# Patient Record
Sex: FEMALE | Race: WHITE | NOT HISPANIC OR LATINO | Employment: OTHER | URBAN - METROPOLITAN AREA
[De-identification: names, ages, dates, MRNs, and addresses within clinical notes are randomized per-mention and may not be internally consistent; named-entity substitution may affect disease eponyms.]

---

## 2017-11-16 ENCOUNTER — TELEPHONE (OUTPATIENT)
Dept: UROLOGY | Facility: CLINIC | Age: 77
End: 2017-11-16

## 2017-11-16 NOTE — TELEPHONE ENCOUNTER
Call placed to inquire what doctors office was referring the patient to obtain those records, no answer, message left for patient to call in the am.

## 2017-11-17 ENCOUNTER — OFFICE VISIT (OUTPATIENT)
Dept: UROLOGY | Facility: CLINIC | Age: 77
End: 2017-11-17
Payer: MEDICARE

## 2017-11-17 ENCOUNTER — HOSPITAL ENCOUNTER (OUTPATIENT)
Dept: RADIOLOGY | Facility: HOSPITAL | Age: 77
Discharge: HOME | End: 2017-11-17
Attending: NURSE PRACTITIONER
Payer: MEDICARE

## 2017-11-17 VITALS
HEART RATE: 108 BPM | WEIGHT: 170.19 LBS | HEIGHT: 68 IN | DIASTOLIC BLOOD PRESSURE: 85 MMHG | TEMPERATURE: 97 F | SYSTOLIC BLOOD PRESSURE: 133 MMHG | BODY MASS INDEX: 25.79 KG/M2

## 2017-11-17 DIAGNOSIS — R31.0 GROSS HEMATURIA: Primary | ICD-10-CM

## 2017-11-17 DIAGNOSIS — N28.89 RENAL MASS: Primary | ICD-10-CM

## 2017-11-17 DIAGNOSIS — R31.0 GROSS HEMATURIA: ICD-10-CM

## 2017-11-17 DIAGNOSIS — R53.83 FATIGUE, UNSPECIFIED TYPE: ICD-10-CM

## 2017-11-17 DIAGNOSIS — R30.0 DYSURIA: ICD-10-CM

## 2017-11-17 DIAGNOSIS — N39.0 RECURRENT URINARY TRACT INFECTION: ICD-10-CM

## 2017-11-17 PROCEDURE — 99204 OFFICE O/P NEW MOD 45 MIN: CPT | Mod: S$PBB,,, | Performed by: NURSE PRACTITIONER

## 2017-11-17 PROCEDURE — 25500020 PHARM REV CODE 255

## 2017-11-17 PROCEDURE — 88112 CYTOPATH CELL ENHANCE TECH: CPT | Performed by: PATHOLOGY

## 2017-11-17 PROCEDURE — 99999 PR PBB SHADOW E&M-EST. PATIENT-LVL V: CPT | Mod: PBBFAC,,, | Performed by: NURSE PRACTITIONER

## 2017-11-17 PROCEDURE — 99215 OFFICE O/P EST HI 40 MIN: CPT | Mod: PBBFAC,PN | Performed by: NURSE PRACTITIONER

## 2017-11-17 PROCEDURE — 87086 URINE CULTURE/COLONY COUNT: CPT

## 2017-11-17 PROCEDURE — 88112 CYTOPATH CELL ENHANCE TECH: CPT | Mod: 26,,, | Performed by: PATHOLOGY

## 2017-11-17 PROCEDURE — 74178 CT ABD&PLV WO CNTR FLWD CNTR: CPT | Mod: TC

## 2017-11-17 PROCEDURE — 74178 CT ABD&PLV WO CNTR FLWD CNTR: CPT | Mod: 26,,, | Performed by: RADIOLOGY

## 2017-11-17 RX ORDER — SIMVASTATIN 40 MG/1
TABLET, FILM COATED ORAL
COMMUNITY
End: 2019-01-02

## 2017-11-17 RX ORDER — DICLOFENAC SODIUM 10 MG/G
GEL TOPICAL
COMMUNITY
End: 2018-07-05

## 2017-11-17 RX ORDER — OXYBUTYNIN CHLORIDE 5 MG/1
TABLET ORAL
COMMUNITY
End: 2017-12-04

## 2017-11-17 RX ORDER — PANTOPRAZOLE SODIUM 40 MG/1
TABLET, DELAYED RELEASE ORAL
COMMUNITY
End: 2018-07-05

## 2017-11-17 RX ORDER — PHENAZOPYRIDINE HYDROCHLORIDE 200 MG/1
TABLET, FILM COATED ORAL
COMMUNITY
End: 2018-07-05

## 2017-11-17 RX ORDER — CHOLECALCIFEROL (VITAMIN D3) 25 MCG
1000 TABLET ORAL
COMMUNITY
End: 2019-01-02

## 2017-11-17 RX ORDER — NITROFURANTOIN 25; 75 MG/1; MG/1
CAPSULE ORAL
Status: ON HOLD | COMMUNITY
End: 2017-12-12 | Stop reason: HOSPADM

## 2017-11-17 RX ORDER — NYSTATIN 100000 [USP'U]/ML
5 SUSPENSION ORAL
COMMUNITY
End: 2018-07-05

## 2017-11-17 RX ORDER — IBUPROFEN 100 MG/5ML
1000 SUSPENSION, ORAL (FINAL DOSE FORM) ORAL
COMMUNITY
End: 2019-01-02

## 2017-11-17 RX ORDER — ACETAMINOPHEN 500 MG
500 TABLET ORAL
COMMUNITY
End: 2018-07-05

## 2017-11-17 RX ORDER — NORTRIPTYLINE HYDROCHLORIDE 50 MG/1
CAPSULE ORAL
COMMUNITY

## 2017-11-17 RX ADMIN — IOHEXOL 75 ML: 350 INJECTION, SOLUTION INTRAVENOUS at 12:11

## 2017-11-17 NOTE — PROGRESS NOTES
[facility] Note  Staff: CLYDE Ludwig    Referring provider and please cc: Brianda Simmons NP  PCP: Brianda Simmons NP ([facility])    Chief Complaint: Recurrent urinary tract infections, gross hematuria    Subjective:        HPI: Denisa Virgen is a 77 y.o. female new patient to Urology clinic, referred by Brianda Simmons NP with [facility] presents with c/o recurrent urinary tract infections and gross hematuria symptoms.  Pt was seen by Mrs. Simmons yesterday in clinic for evaluation of symptoms.  Pt has a history of recurrent bladder infections, but today she states she started seeing a discoloration in her urine.  Yesterday, her PCP office prescribed the patient Macrobid 100 mg one tablet twice daily x 14 days for questionable infection at this time.  I am unsure as to whether her PCP office sent for urine culture tests.  (UA results below)    Questions asked pt during office visit today:  DTF: YES, NTF: 3x night  Urgency:Yes, incontinence with urgency? Yes;   Incontinence with laughing or straining: Yes; bothersome: Yes   If yes, how many pads/day? 1-2 daily  *Reviewing med list it states she currently takes oxybutynin 5 mg one tablet daily, but in office today, pt denies ever taking this medication.  Gross Hematuria:  YES  History of UTI: Yes - kidney infections x 3-4 years.  Sexually active?: No  Constipation issues?:  YES, does not take daily meds for this.    REVIEW OF SYSTEMS:  Review of Systems   Constitutional: Positive for malaise/fatigue. Negative for chills, diaphoresis, fever and weight loss.   HENT: Negative for congestion, hearing loss, nosebleeds and sore throat.    Eyes: Negative for blurred vision and pain.   Respiratory: Negative for cough and wheezing.    Cardiovascular: Negative for chest pain, palpitations and leg swelling.   Gastrointestinal: Negative for abdominal pain, heartburn, nausea and vomiting.   Genitourinary: Positive for dysuria, frequency,  hematuria and urgency. Negative for flank pain.        +Incontinence   Musculoskeletal: Negative for back pain, joint pain, myalgias and neck pain.   Skin: Negative for itching and rash.   Neurological: Negative for dizziness, tremors, sensory change, seizures, loss of consciousness, weakness and headaches.   Endo/Heme/Allergies: Does not bruise/bleed easily.   Psychiatric/Behavioral: Negative for depression and suicidal ideas. The patient is not nervous/anxious.      PMHx:  Past Medical History:   Diagnosis Date    Asthma     Chronic low back pain     COPD (chronic obstructive pulmonary disease)     GERD (gastroesophageal reflux disease)     High cholesterol      Kidney stones: No    PSHx:  Past Surgical History:   Procedure Laterality Date    HYSTERECTOMY      TONSILLECTOMY      veins in left leg       Fam Hx:   malignancies: No, gyn malignancies: No   kidney stones: No     Social History     Social History    Marital status: Single     Spouse name: N/A    Number of children: N/A    Years of education: N/A     Social History Main Topics    Smoking status: Former Smoker    Smokeless tobacco: Never Used    Alcohol use No    Drug use: No    Sexual activity: No     Other Topics Concern    None     Social History Narrative    None     Allergies:  Levofloxacin    Medications: reviewed   Anticoagulation: No    Objective:     Vitals:    11/17/17 1050   BP: 133/85   Pulse: 108   Temp: 97.4 °F (36.3 °C)     Physical Exam   Vitals reviewed.  Constitutional: She is oriented to person, place, and time. She appears well-developed and well-nourished.   HENT:   Head: Normocephalic and atraumatic.   Eyes: Conjunctivae and EOM are normal. Pupils are equal, round, and reactive to light.   Neck: Normal range of motion. Neck supple.   Cardiovascular: Normal rate, regular rhythm, normal heart sounds and intact distal pulses.    Pulmonary/Chest: Effort normal and breath sounds normal.   Abdominal: Soft. Bowel sounds  "are normal.   Musculoskeletal: Normal range of motion.   Neurological: She is alert and oriented to person, place, and time. She has normal reflexes.   Skin: Skin is warm and dry.     Psychiatric: She has a normal mood and affect. Her behavior is normal. Judgment and thought content normal.     LABS REVIEW:  BUN/Cr done on 10/25/17:  10/0.69  GFR:  84    UA today: Pt's urine today was unable to dip, mixed with pyridium and blood.    Hx of Urinalysis done on 11/16/17:  (No records of culture being sent out)  Dark, Yellow and Cloudy Urine  SG  1.025; ph  5.5  Moderate Blood  30 Protein  Moderate Leukocytes    UCx:  08/15/17:  +E.Coli infection  (only culture results seen on file)    Assessment:       1. Gross hematuria    2. Recurrent urinary tract infection    3. Fatigue, unspecified type    4. Dysuria          Plan:   Gross hematuria with questionable "recurrent" urinary tract infections or gross hematuria of unknown origin.    1.  We will send urine for culture and cytology testing from today.  2.  CT Urogram with serum cr to be performed for further evaluation.  3.  Encouraged pt to stay on Macrobid until we receive results from lab/imaging.    F/u with Urologist for possible cystoscopy due to gross hematuria.    [facility]: Pending    Mitzi Hunt, MIKHAIL-PINA  "

## 2017-11-17 NOTE — PATIENT INSTRUCTIONS
What is Hematuria?  Blood in your urine is a condition known as hematuria. Most of the time, the cause of hematuria is not serious. But, never ignore blood in the urine. Your doctor can evaluate you to find the cause of the bleeding and treat it, if needed.  Types of hematuria  · Gross hematuria means that the blood can be seen by the naked eye. The urine may look pinkish, brownish, or bright red.  · Microscopic hematuria means that the urine is clear, but blood cells can be seen when urine is looked at under a microscope or tested in a lab.  Both types of hematuria can have the same causes. Neither one is necessarily more serious than the other. With either type, you may have other symptoms, such as pain, pressure, or burning when you urinate, abdominal pain, or back pain. Or, you may not have any other symptoms. No matter how much blood is found, the cause of the bleeding needs to be identified.  Finding the cause of hematuria  To evaluate your condition, your doctor will first confirm that blood is indeed present. Then other tests are done to pinpoint where the blood is coming from and why. Your doctor will decide which tests will best determine the cause of your hematuria. Some common tests are listed below.  · History and physical exam  · Lab tests may include urinalysis, a urine culture, a urine cytology, and various blood tests  · Cystoscopy  · Computed tomography (CT) or CT urography  · Magnetic resonance imaging (MRI) or MR urography  · Ultrasound of the kidney  · Kidney biopsy  Causes of hematuria include the very benign (exercised induced hematuria) to the very severe (cancer of the urinary system). A variety of treatments are available depending on the cause.  Date Last Reviewed: 12/2/2016 © 2000-2017 [employer]. [address] [place] [address]. All rights reserved. This information is not intended as a substitute for professional medical care. Always follow your healthcare  professional's instructions.        Hematuria: Possible Causes     Many things can lead to blood in the urine (hematuria). The blood may be seen with the eye (macroscopic or gross hematuria). Or it may only be seen when the urine is looked at under a microscope (microscopic hematuria). Some of the most common causes of blood in the urine are listed below. Often, no cause for the blood can be found. This is called idiopathic hematuria.  · Kidney or bladder stones are collections of crystals. They form in the urine. Stones may be found anywhere in the urinary tract. But they form most often in the kidneys or bladder. In addition to blood in the urine, they can cause severe pain.  · BPH stands for benign prostatic hyperplasia. It is enlargement of the prostate gland. It happens as men age. BPH often causes problems with urination. It sometimes causes blood in the urine.  · A urinary tract infection (UTI) is due to bacteria growing in the urinary tract. It can cause blood in the urine. Other symptoms include burning or pain with urination. You may need to urinate often or urgently. You may also have a fever.  · Damage to the urinary tract may cause blood in the urine. This damage may be due to a blow or accident. It may also result from the use of a urinary catheter. Very hard exercise may sometimes irritate the urinary tract and cause bleeding.  · Cancer may occur anywhere in the urinary tract. A tumor may sometimes cause no symptoms other than bleeding.     Other possible causes of bleeding include:  · Prostatitis (infection of the prostate gland)  · Taking anticoagulants  · Blockage in the urinary tract  · Disease or inflammation of the kidney  · Cystic diseases of the kidneys  · Sickle cell anemia  · Vigorous exercise  · Endometriosis  Date Last Reviewed: 12/1/2016 © 2000-2017 [employer]. [address] [place] [address], [place] [address]. All rights reserved. This information is not intended as a substitute  "for professional medical care. Always follow your healthcare professional's instructions.        Bladder Infection, Female (Adult)    Urine is normally doesn't have any bacteria in it. But bacteria can get into the urinary tract from the skin around the rectum. Or they can travel in the blood from elsewhere in the body. Once they are in your urinary tract, they can cause infection in the urethra (urethritis), the bladder (cystitis), or the kidneys (pyelonephritis).  The most common place for an infection is in the bladder. This is called a bladder infection. This is one of the most common infections in women. Most bladder infections are easily treated. They are not serious unless the infection spreads to the kidney.  The phrases "bladder infection," "UTI," and "cystitis" are often used to describe the same thing. But they are not always the same. Cystitis is an inflammation of the bladder. The most common cause of cystitis is an infection.  Symptoms  The infection causes inflammation in the urethra and bladder. This causes many of the symptoms. The most common symptoms of a bladder infection are:  · Pain or burning when urinating  · Having to urinate more often than usual  · Urgent need to urinate  · Only a small amount of urine comes out  · Blood in urine  · Abdominal discomfort. This is usually in the lower abdomen above the pubic bone.  · Cloudy urine  · Strong- or bad-smelling urine  · Unable to urinate (urinary retention)  · Unable to hold urine in (urinary incontinence)  · Fever  · Loss of appetite  · Confusion (in older adults)  Causes  Bladder infections are not contagious. You can't get one from someone else, from a toilet seat, or from sharing a bath.  The most common cause of bladder infections is bacteria from the bowels. The bacteria get onto the skin around the opening of the urethra. From there, they can get into the urine and travel up to the bladder, causing inflammation and infection. This usually " happens because of:  · Wiping improperly after urinating. Always wipe from front to back.  · Bowel incontinence  · Pregnancy  · Procedures such as having a catheter inserted  · Older age  · Not emptying your bladder. This can allow bacteria a chance to grow in your urine.  · Dehydration  · Constipation  · Sex  · Use of a diaphragm for birth control   Treatment  Bladder infections are diagnosed by a urine test. They are treated with antibiotics and usually clear up quickly without complications. Treatment helps prevent a more serious kidney infection.  Medicines  Medicines can help in the treatment of a bladder infection:  · Take antibiotics until they are used up, even if you feel better. It is important to finish them to make sure the infection has cleared.  · You can use acetaminophen or ibuprofen for pain, fever, or discomfort, unless another medicine was prescribed. If you have chronic liver or kidney disease, talk with your healthcare provider before using these medicines. Also talk with your provider if you've ever had a stomach ulcer or gastrointestinal bleeding, or are taking blood-thinner medicines.  · If you are given phenazopydridine to reduce burning with urination, it will cause your urine to become a bright orange color. This can stain clothing.  Care and prevention  These self-care steps can help prevent future infections:  · Drink plenty of fluids to prevent dehydration and flush out your bladder. Do this unless you must restrict fluids for other health reasons, or your doctor told you not to.  · Proper cleaning after going to the bathroom is important. Wipe from front to back after using the toilet to prevent the spread of bacteria.  · Urinate more often. Don't try to hold urine in for a long time.  · Wear loose-fitting clothes and cotton underwear. Avoid tight-fitting pants.  · Improve your diet and prevent constipation. Eat more fresh fruit and vegetables, and fiber, and less junk and fatty  foods.  · Avoid sex until your symptoms are gone.  · Avoid caffeine, alcohol, and spicy foods. These can irritate your bladder.  · Urinate right after intercourse to flush out your bladder.  · If you use birth control pills and have frequent bladder infections, discuss it with your doctor.  Follow-up care  Call your healthcare provider if all symptoms are not gone after 3 days of treatment. This is especially important if you have repeat infections.  If a culture was done, you will be told if your treatment needs to be changed. If directed, you can call to find out the results.  If X-rays were done, you will be told if the results will affect your treatment.  Call 911  Call 911 if any of the following occur:  · Trouble breathing  · Hard to wake up or confusion  · Fainting or loss of consciousness  · Rapid heart rate  When to seek medical advice  Call your healthcare provider right away if any of these occur:  · Fever of 100.4ºF (38.0ºC) or higher, or as directed by your healthcare provider  · Symptoms are not better by the third day of treatment  · Back or belly (abdominal) pain that gets worse  · Repeated vomiting, or unable to keep medicine down  · Weakness or dizziness  · Vaginal discharge  · Pain, redness, or swelling in the outer vaginal area (labia)  Date Last Reviewed: 10/1/2016  © 3432-5663 [employer]. [address] [place] [address], [place] [address]. All rights reserved. This information is not intended as a substitute for professional medical care. Always follow your healthcare professional's instructions.

## 2017-11-18 LAB — BACTERIA UR CULT: NO GROWTH

## 2017-11-21 ENCOUNTER — HOSPITAL ENCOUNTER (OUTPATIENT)
Dept: RADIOLOGY | Facility: HOSPITAL | Age: 77
Discharge: HOME | End: 2017-11-21
Attending: NURSE PRACTITIONER
Payer: MEDICARE

## 2017-11-21 DIAGNOSIS — N28.89 RENAL MASS: ICD-10-CM

## 2017-11-21 PROCEDURE — 74183 MRI ABD W/O CNTR FLWD CNTR: CPT | Mod: TC

## 2017-11-21 PROCEDURE — A9585 GADOBUTROL INJECTION: HCPCS | Performed by: NURSE PRACTITIONER

## 2017-11-21 PROCEDURE — 74183 MRI ABD W/O CNTR FLWD CNTR: CPT | Mod: 26,,, | Performed by: RADIOLOGY

## 2017-11-21 PROCEDURE — 25500020 PHARM REV CODE 255: Performed by: NURSE PRACTITIONER

## 2017-11-21 RX ORDER — GADOBUTROL 604.72 MG/ML
7 INJECTION INTRAVENOUS
Status: COMPLETED | OUTPATIENT
Start: 2017-11-21 | End: 2017-11-21

## 2017-11-21 RX ORDER — GADOBUTROL 604.72 MG/ML
INJECTION INTRAVENOUS
Status: DISPENSED
Start: 2017-11-21 | End: 2017-11-21

## 2017-11-21 RX ADMIN — GADOBUTROL 7 ML: 604.72 INJECTION INTRAVENOUS at 08:11

## 2017-11-29 ENCOUNTER — TELEPHONE (OUTPATIENT)
Dept: UROLOGY | Facility: CLINIC | Age: 77
End: 2017-11-29

## 2017-11-29 NOTE — TELEPHONE ENCOUNTER
Please call and inform pt of MRI results:  1.  Multiple simple cysts in liver involving the left and right hepatic lobes.  2. 3 renal masses are noted, each of which demonstrates characteristics consistent with 3 normal cysts which no follow-up is required on the cysts, but Urologist will further explain MRI results to you at next visit.      ----- Message from Tricia Jaime sent at 11/29/2017 10:05 AM CST -----  Contact: self  Patient is needing the results of her MRI   Please call back [phone]  Or cell [phone]

## 2017-12-04 ENCOUNTER — OFFICE VISIT (OUTPATIENT)
Dept: UROLOGY | Facility: CLINIC | Age: 77
End: 2017-12-04
Payer: MEDICARE

## 2017-12-04 VITALS
DIASTOLIC BLOOD PRESSURE: 83 MMHG | SYSTOLIC BLOOD PRESSURE: 133 MMHG | HEART RATE: 111 BPM | RESPIRATION RATE: 18 BRPM | BODY MASS INDEX: 25.79 KG/M2 | HEIGHT: 68 IN | WEIGHT: 170.19 LBS

## 2017-12-04 DIAGNOSIS — R31.0 GROSS HEMATURIA: Primary | ICD-10-CM

## 2017-12-04 DIAGNOSIS — R39.15 URINARY URGENCY: ICD-10-CM

## 2017-12-04 DIAGNOSIS — N28.1 RENAL CYSTS, ACQUIRED, BILATERAL: ICD-10-CM

## 2017-12-04 PROCEDURE — 99215 OFFICE O/P EST HI 40 MIN: CPT | Mod: PBBFAC,PN | Performed by: UROLOGY

## 2017-12-04 PROCEDURE — 81002 URINALYSIS NONAUTO W/O SCOPE: CPT | Mod: PBBFAC,PN | Performed by: UROLOGY

## 2017-12-04 PROCEDURE — 87086 URINE CULTURE/COLONY COUNT: CPT

## 2017-12-04 PROCEDURE — 99999 PR PBB SHADOW E&M-EST. PATIENT-LVL V: CPT | Mod: PBBFAC,,, | Performed by: UROLOGY

## 2017-12-04 PROCEDURE — 99214 OFFICE O/P EST MOD 30 MIN: CPT | Mod: S$PBB,,, | Performed by: UROLOGY

## 2017-12-04 RX ORDER — MONTELUKAST SODIUM 10 MG/1
TABLET ORAL
COMMUNITY

## 2017-12-04 RX ORDER — TIZANIDINE HYDROCHLORIDE 4 MG/1
CAPSULE, GELATIN COATED ORAL
COMMUNITY
End: 2019-03-07 | Stop reason: SDUPTHER

## 2017-12-04 RX ORDER — PREDNISONE 50 MG/1
TABLET ORAL
COMMUNITY
End: 2018-07-05

## 2017-12-04 RX ORDER — VITAMIN E 268 MG
CAPSULE ORAL
COMMUNITY
End: 2019-01-02

## 2017-12-04 RX ORDER — VITAMIN A 3000 MCG
10000 CAPSULE ORAL
COMMUNITY
End: 2019-01-02

## 2017-12-04 RX ORDER — FLUTICASONE FUROATE AND VILANTEROL 100; 25 UG/1; UG/1
POWDER RESPIRATORY (INHALATION)
COMMUNITY
End: 2019-05-01

## 2017-12-04 RX ORDER — ALPRAZOLAM 1 MG/1
TABLET ORAL
COMMUNITY
End: 2019-01-02

## 2017-12-04 NOTE — PATIENT INSTRUCTIONS
Discussed conservative measures to control urgency and frequency including avoiding bladder irritants, timed voiding (go to urinate every 2 hours despite need), not postponing voiding (dont hold it!), and bowel regimen as distended bowel has extrinsic compressive effect on bladder.   - miralax (1cap/day, can go down to 1/2 cap or up to 2x/d)  - stool softener - 1x/day to 2x/day - I.e. colace  - fiber supplements (metamucil, fiber pills) and increase dietary fiber  - prunes (sunsweet makes flavored ones - lemon cherry etc - [employer] etc)  Can use any/all in any combination daily that produces soft daily bowel movement - will take some adjustment     Discussed bladder irritants include coffe (even decaf), tea, alcohol, soda, spicy foods, acidic juices (orange, tomato), vinegar, and artificial sweeteners.  - avoid especially in afternoon and evening    INCREASE water intake     Stop oxybutynin/ditropan. Start myrbetriq (mirabegron)    12/12/17 - cystoscopy - look in bladder

## 2017-12-04 NOTE — Clinical Note
1. Forgot to order/have you schedule RBUS. She comes from [place] so will either need to schedule for tues right before (between 1130-12) or right after (140-230) at ultrasound. Not too much earlier or later given her commute - please arrange with ultrasound dept first if spot not available before calling patient  2. Need all urine cxs from alphonso ferraro np and pcp listed on file, as well as requested from [facility]

## 2017-12-05 LAB — BACTERIA UR CULT: NORMAL

## 2017-12-08 NOTE — PROGRESS NOTES
[facility] Urology:    Denisa Virgen is a 77 y.o. female who presents for evaluation of renal cysts and gross hematuria.    She was initially seen by MELINDA Hunt on 11/17/17 at the referral of Brianda Simmons NP. She has reportedly had recurrent UTIs (last E Coli on 8/5/17) and had recent gross hematuria. She had been given macrobid 14d course at that time after seeing red discoloration in urine. Udip in clinic had moderate blood. Ucx negative.  She did also c/o urgency and urge 1-2 daily, and had been taking oxybutynin 5mg daily by report but was unsure of if taking it. Also noted constipation.  Cytology negative. CT urogram was ordered and demonstrated:    There is a an approximately 1 cm mass arising posteriorly from the mid left kidney which measures 62 Hounsfield units on the noncontrast phase, 71 Hounsfield units on the contrast phase and 64 Hounsfield units on the delayed phase. This is most consistent with a small hemorrhagic cyst. There is also an approximately 1.3 cm mass along the superior margin of the right kidney measuring 60 Hounsfield units on the precontrast phase, 56 Hounsfield units on the nephrographic phase and 49 Hounsfield units on the delayed phase, most consistent with a hemorrhagic cyst. A third mass measuring 9 mm on nephrographic phase image 43 is not well seen on the other phases and is too small to characterize. No nephroureterolithiasis is identified. The kidneys excrete contrast symmetrically. No ureteral filling defect is identified. No hydroureteronephrosis is seen. The urinary bladder is mildly well-distended. No focal bladder wall thickening or bladder filling defect is identified. No abdominal or pelvic lymphadenopathy is seen. The aorta is non-aneurysmal. There is moderate atherosclerosis.  - on personal review, posterior left mass is most obvious, 1.3cm, hyperdense, 49-51HU pre contrast, 60-79 HU post contrast.     A follow up MRI was obtained, which confirmed finding of hemorrhagic  cysts  There is a T1 hyperintense and T2 hypointense, 1.6 cm mass in the upper pole of the right kidney consistent with a hemorrhagic cyst. There is a T1 hyperintense and T2 hypointense, 1.4 cm nonenhancing mass involving the mid left kidney, posteriorly, consistent with a hemorrhagic cyst. There is a slightly T1 hyperintense and T2 hypointense, nonenhancing mass in the upper pole of the left kidney measuring 1 cm, consistent with a hemorrhagic cyst. No additional renal masses are identified. No hydroureteronephrosis is seen    Her PCP told her there was blood in her urine day prior to eval above, and by that day had pink urine. Has not seen any further gross hematuria since completing macrobid.  No family history  malignancy  Quit smoking 40 years ago after 10 years of smoking  No chemical exposures    Urgency all the time for about 1 month, to the point she has bough pads and is using about 9-10 per day. UUI with most voids.  Reports recurrent UTIs which then become yeast infections.  Has had cultures with MELINDA Simmons and at [facility] in [place].  ++ constipation - may be up to 2 weeks between BMs x3-4 mos. Occasional laxative. Has increased water intake. No bowel regimen.  She denies dysuria  She drink 1-2 cups coffee/am, 1 cup about 4pm, occ tea.  Has bad dry mouth  On review of current med list, does seem to be taking oxybutynin  Udip 1+ leuks, trc prot, trc blood       Past Medical History:   Diagnosis Date    Asthma     Chronic low back pain     COPD (chronic obstructive pulmonary disease)     GERD (gastroesophageal reflux disease)     High cholesterol        Past Surgical History:   Procedure Laterality Date    HYSTERECTOMY      TONSILLECTOMY      veins in left leg         Family History   Problem Relation Age of Onset    Heart disease Father     Heart disease Mother        Social History     Social History    Marital status:      Spouse name: N/A    Number of children: N/A    Years  "of education: N/A     Occupational History    Not on file.     Social History Main Topics    Smoking status: Former Smoker     Quit date: 1970    Smokeless tobacco: Never Used    Alcohol use No    Drug use: No    Sexual activity: No     Other Topics Concern    Not on file     Social History Narrative    No narrative on file       Review of patient's allergies indicates:   Allergen Reactions    Levofloxacin Rash       Medications Reviewed: see MAR    ROS:    As noted above in HPI otherwise negative x 10 systems reviewed.     PHYSICAL EXAM:    Vitals:    12/04/17 0953   BP: 133/83   Pulse: (!) 111   Resp: 18     Body mass index is 25.88 kg/m². Weight: 77.2 kg (170 lb 3.1 oz) Height: 5' 8" (172.7 cm)       General: Alert, cooperative, no distress, appears stated age  Head: Normocephalic, without obvious abnormality, atraumatic  Neck: no masses, no thyromegaly, no lymphadenopathy  Eyes: PERRL, conjunctiva/corneas clear  Lungs: Respirations unlabored, normal effort, no accessory muscle use  CV: Warm and well perfused extremities  Abdomen: Soft, non-tender, no CVA tenderness, no hepatosplenomegaly, no hernia  Extremities: Extremities normal, atraumatic, no cyanosis or edema  Skin: Normal color, texture, and turgor, no rashes or lesions  Psych: Appropriate, well oriented, normal affect, normal mood  Neuro: Non-focal      Assessment/Diagnosis:    1. Gross hematuria  POCT URINE DIPSTICK WITHOUT MICROSCOPE    Urine culture    Case Request Operating Room: CYSTOSCOPY    Place in Outpatient   2. Urinary urgency  mirabegron 50 mg Tb24   3. Renal cysts, acquired, bilateral  US Kidney Only       Plans:  Discussed conservative measures to control urgency and frequency including avoiding bladder irritants, timed voiding (go to urinate every 2 hours despite need), not postponing voiding (dont hold it!), and bowel regimen as distended bowel has extrinsic compressive effect on bladder. Written instructions/OTC agent recs for " daily BM provided.   Discussed bladder irritants include coffe (even decaf), tea, alcohol, soda, spicy foods, acidic juices (orange, tomato), vinegar, and artificial sweeteners.  - avoid especially in afternoon and evening   INCREASE water intake   Stop oxybutynin/ditropan. Avoid anticholinergics due to risk of dementia and AMS in patients over 70, not to mention she has severe constipation and dry mouth which are known side effects.  As her urgency is severe and would benefit from medical therapy in addition to conservative measures above, will Start myrbetriq (mirabegron)     For her gross hematuria, she has had negative workup so far, but given both gross hematuria and questionably recurrent UTIs with new urgency, advised she needs cystoscopic evaluation and has been scheduled 12/12/17 at [facility]. Also advised baseline renal US that day so her renal cysts can be followed in the future with ultrasound.  As she does come from [place], once workup complete and stable from urgency and urge incontinence, will likely follow up with Dr Dinh.

## 2017-12-12 ENCOUNTER — HOSPITAL ENCOUNTER (OUTPATIENT)
Facility: AMBULARY SURGERY CENTER | Age: 77
Discharge: HOME | End: 2017-12-12
Attending: UROLOGY | Admitting: UROLOGY
Payer: MEDICARE

## 2017-12-12 ENCOUNTER — SURGERY (OUTPATIENT)
Age: 77
End: 2017-12-12

## 2017-12-12 VITALS
TEMPERATURE: 98 F | HEIGHT: 68 IN | HEART RATE: 105 BPM | RESPIRATION RATE: 18 BRPM | SYSTOLIC BLOOD PRESSURE: 149 MMHG | OXYGEN SATURATION: 97 % | WEIGHT: 170 LBS | DIASTOLIC BLOOD PRESSURE: 72 MMHG | BODY MASS INDEX: 25.76 KG/M2

## 2017-12-12 DIAGNOSIS — R31.0 GROSS HEMATURIA: ICD-10-CM

## 2017-12-12 PROCEDURE — 52204 CYSTOSCOPY W/BIOPSY(S): CPT | Performed by: UROLOGY

## 2017-12-12 PROCEDURE — 52214 CYSTOSCOPY AND TREATMENT: CPT | Performed by: UROLOGY

## 2017-12-12 PROCEDURE — G8907 PT DOC NO EVENTS ON DISCHARG: HCPCS | Performed by: UROLOGY

## 2017-12-12 PROCEDURE — G8918 PT W/O PREOP ORDER IV AB PRO: HCPCS | Performed by: UROLOGY

## 2017-12-12 PROCEDURE — 88305 TISSUE EXAM BY PATHOLOGIST: CPT | Performed by: PATHOLOGY

## 2017-12-12 PROCEDURE — 52204 CYSTOSCOPY W/BIOPSY(S): CPT | Mod: ,,, | Performed by: UROLOGY

## 2017-12-12 PROCEDURE — 52224 CYSTOSCOPY AND TREATMENT: CPT | Performed by: UROLOGY

## 2017-12-12 RX ORDER — WATER 1 ML/ML
IRRIGANT IRRIGATION
Status: DISCONTINUED | OUTPATIENT
Start: 2017-12-12 | End: 2017-12-12 | Stop reason: HOSPADM

## 2017-12-12 RX ORDER — LIDOCAINE HYDROCHLORIDE 20 MG/ML
JELLY TOPICAL
Status: DISCONTINUED | OUTPATIENT
Start: 2017-12-12 | End: 2017-12-12 | Stop reason: HOSPADM

## 2017-12-12 RX ORDER — CEFUROXIME AXETIL 250 MG/1
250 TABLET ORAL DAILY
Qty: 90 TABLET | Refills: 0 | Status: SHIPPED | OUTPATIENT
Start: 2017-12-12 | End: 2018-01-30

## 2017-12-12 RX ORDER — FLUCONAZOLE 150 MG/1
150 TABLET ORAL DAILY
Qty: 1 TABLET | Refills: 1 | Status: SHIPPED | OUTPATIENT
Start: 2017-12-12 | End: 2017-12-13

## 2017-12-12 RX ORDER — ACETAMINOPHEN 325 MG/1
TABLET ORAL
Status: DISCONTINUED
Start: 2017-12-12 | End: 2017-12-12 | Stop reason: HOSPADM

## 2017-12-12 RX ORDER — ACETAMINOPHEN 325 MG/1
650 TABLET ORAL EVERY 6 HOURS PRN
Status: DISCONTINUED | OUTPATIENT
Start: 2017-12-12 | End: 2017-12-12 | Stop reason: HOSPADM

## 2017-12-12 RX ORDER — LIDOCAINE HYDROCHLORIDE 20 MG/ML
JELLY TOPICAL
Status: DISPENSED
Start: 2017-12-12 | End: 2017-12-13

## 2017-12-12 RX ADMIN — LIDOCAINE HYDROCHLORIDE 5 ML: 20 JELLY TOPICAL at 01:12

## 2017-12-12 RX ADMIN — ACETAMINOPHEN 650 MG: 325 TABLET ORAL at 01:12

## 2017-12-12 RX ADMIN — WATER 500 ML: 1 IRRIGANT IRRIGATION at 01:12

## 2017-12-12 NOTE — PLAN OF CARE
Patient given Tylenol as requested. Her appointments were reviewed. She was able to walk out the door without difficulty.

## 2017-12-12 NOTE — H&P (VIEW-ONLY)
[facility] Urology:    Denisa Virgen is a 77 y.o. female who presents for evaluation of renal cysts and gross hematuria.    She was initially seen by MELINDA Hunt on 11/17/17 at the referral of Brianda Simmons NP. She has reportedly had recurrent UTIs (last E Coli on 8/5/17) and had recent gross hematuria. She had been given macrobid 14d course at that time after seeing red discoloration in urine. Udip in clinic had moderate blood. Ucx negative.  She did also c/o urgency and urge 1-2 daily, and had been taking oxybutynin 5mg daily by report but was unsure of if taking it. Also noted constipation.  Cytology negative. CT urogram was ordered and demonstrated:    There is a an approximately 1 cm mass arising posteriorly from the mid left kidney which measures 62 Hounsfield units on the noncontrast phase, 71 Hounsfield units on the contrast phase and 64 Hounsfield units on the delayed phase. This is most consistent with a small hemorrhagic cyst. There is also an approximately 1.3 cm mass along the superior margin of the right kidney measuring 60 Hounsfield units on the precontrast phase, 56 Hounsfield units on the nephrographic phase and 49 Hounsfield units on the delayed phase, most consistent with a hemorrhagic cyst. A third mass measuring 9 mm on nephrographic phase image 43 is not well seen on the other phases and is too small to characterize. No nephroureterolithiasis is identified. The kidneys excrete contrast symmetrically. No ureteral filling defect is identified. No hydroureteronephrosis is seen. The urinary bladder is mildly well-distended. No focal bladder wall thickening or bladder filling defect is identified. No abdominal or pelvic lymphadenopathy is seen. The aorta is non-aneurysmal. There is moderate atherosclerosis.  - on personal review, posterior left mass is most obvious, 1.3cm, hyperdense, 49-51HU pre contrast, 60-79 HU post contrast.     A follow up MRI was obtained, which confirmed finding of hemorrhagic  cysts  There is a T1 hyperintense and T2 hypointense, 1.6 cm mass in the upper pole of the right kidney consistent with a hemorrhagic cyst. There is a T1 hyperintense and T2 hypointense, 1.4 cm nonenhancing mass involving the mid left kidney, posteriorly, consistent with a hemorrhagic cyst. There is a slightly T1 hyperintense and T2 hypointense, nonenhancing mass in the upper pole of the left kidney measuring 1 cm, consistent with a hemorrhagic cyst. No additional renal masses are identified. No hydroureteronephrosis is seen    Her PCP told her there was blood in her urine day prior to eval above, and by that day had pink urine. Has not seen any further gross hematuria since completing macrobid.  No family history  malignancy  Quit smoking 40 years ago after 10 years of smoking  No chemical exposures    Urgency all the time for about 1 month, to the point she has bough pads and is using about 9-10 per day. UUI with most voids.  Reports recurrent UTIs which then become yeast infections.  Has had cultures with MELINDA Simmons and at [facility] in [place].  ++ constipation - may be up to 2 weeks between BMs x3-4 mos. Occasional laxative. Has increased water intake. No bowel regimen.  She denies dysuria  She drink 1-2 cups coffee/am, 1 cup about 4pm, occ tea.  Has bad dry mouth  On review of current med list, does seem to be taking oxybutynin  Udip 1+ leuks, trc prot, trc blood       Past Medical History:   Diagnosis Date    Asthma     Chronic low back pain     COPD (chronic obstructive pulmonary disease)     GERD (gastroesophageal reflux disease)     High cholesterol        Past Surgical History:   Procedure Laterality Date    HYSTERECTOMY      TONSILLECTOMY      veins in left leg         Family History   Problem Relation Age of Onset    Heart disease Father     Heart disease Mother        Social History     Social History    Marital status:      Spouse name: N/A    Number of children: N/A    Years  "of education: N/A     Occupational History    Not on file.     Social History Main Topics    Smoking status: Former Smoker     Quit date: 1970    Smokeless tobacco: Never Used    Alcohol use No    Drug use: No    Sexual activity: No     Other Topics Concern    Not on file     Social History Narrative    No narrative on file       Review of patient's allergies indicates:   Allergen Reactions    Levofloxacin Rash       Medications Reviewed: see MAR    ROS:    As noted above in HPI otherwise negative x 10 systems reviewed.     PHYSICAL EXAM:    Vitals:    12/04/17 0953   BP: 133/83   Pulse: (!) 111   Resp: 18     Body mass index is 25.88 kg/m². Weight: 77.2 kg (170 lb 3.1 oz) Height: 5' 8" (172.7 cm)       General: Alert, cooperative, no distress, appears stated age  Head: Normocephalic, without obvious abnormality, atraumatic  Neck: no masses, no thyromegaly, no lymphadenopathy  Eyes: PERRL, conjunctiva/corneas clear  Lungs: Respirations unlabored, normal effort, no accessory muscle use  CV: Warm and well perfused extremities  Abdomen: Soft, non-tender, no CVA tenderness, no hepatosplenomegaly, no hernia  Extremities: Extremities normal, atraumatic, no cyanosis or edema  Skin: Normal color, texture, and turgor, no rashes or lesions  Psych: Appropriate, well oriented, normal affect, normal mood  Neuro: Non-focal      Assessment/Diagnosis:    1. Gross hematuria  POCT URINE DIPSTICK WITHOUT MICROSCOPE    Urine culture    Case Request Operating Room: CYSTOSCOPY    Place in Outpatient   2. Urinary urgency  mirabegron 50 mg Tb24   3. Renal cysts, acquired, bilateral  US Kidney Only       Plans:  Discussed conservative measures to control urgency and frequency including avoiding bladder irritants, timed voiding (go to urinate every 2 hours despite need), not postponing voiding (dont hold it!), and bowel regimen as distended bowel has extrinsic compressive effect on bladder. Written instructions/OTC agent recs for " daily BM provided.   Discussed bladder irritants include coffe (even decaf), tea, alcohol, soda, spicy foods, acidic juices (orange, tomato), vinegar, and artificial sweeteners.  - avoid especially in afternoon and evening   INCREASE water intake   Stop oxybutynin/ditropan. Avoid anticholinergics due to risk of dementia and AMS in patients over 70, not to mention she has severe constipation and dry mouth which are known side effects.  As her urgency is severe and would benefit from medical therapy in addition to conservative measures above, will Start myrbetriq (mirabegron)     For her gross hematuria, she has had negative workup so far, but given both gross hematuria and questionably recurrent UTIs with new urgency, advised she needs cystoscopic evaluation and has been scheduled 12/12/17 at [facility]. Also advised baseline renal US that day so her renal cysts can be followed in the future with ultrasound.  As she does come from [place], once workup complete and stable from urgency and urge incontinence, will likely follow up with Dr Dinh.

## 2017-12-12 NOTE — INTERVAL H&P NOTE
The patient has been examined and the H&P has been reviewed:    No changes to above. Proceed with cystoscopy. Pt is acceptable candidate for procedure at [facility]    Anesthesia/Surgery risks, benefits and alternative options discussed and understood by patient/family.          Active Hospital Problems    Diagnosis  POA    Gross hematuria [R31.0]  Yes      Resolved Hospital Problems    Diagnosis Date Resolved POA   No resolved problems to display.

## 2017-12-13 LAB
BILIRUB SERPL-MCNC: ABNORMAL MG/DL
BLOOD URINE, POC: ABNORMAL
COLOR, POC UA: CLEAR
GLUCOSE UR QL STRIP: ABNORMAL
KETONES UR QL STRIP: ABNORMAL
LEUKOCYTE ESTERASE URINE, POC: ABNORMAL
NITRITE, POC UA: ABNORMAL
PH, POC UA: 5
PROTEIN, POC: ABNORMAL
SPECIFIC GRAVITY, POC UA: 1.02
UROBILINOGEN, POC UA: ABNORMAL

## 2017-12-13 NOTE — BRIEF OP NOTE
Urology Operative/ Brief Discharge Note     Date: 12/12/2017     Staff Surgeon: Reza Cole MD     Pre-Op Diagnosis:   1. Gross hematuria  2. Recurrent UTIs     Post-Op Diagnosis: same     Procedure(s) Performed:   Cystoscopy (flexible), with bladder biopsy and fulguration      Specimen(s): 1. 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  No gross papillary tumors     Estimated Blood Loss: minimal     Drains: none     Complications: none     Indications for procedure:  76 yo F with recurrent UTIs and yeast infections who recently experienced gross hematuria. Imaging revealed only hemorrhagic renal cysts, and culture and cytology negative. Here today for cystoscopic evaluation.  Also recent urgency with urge incontinence. Constipated and has not started bowel regimen. Recently started mybetriq.     Procedure in detail:  After informed consent, the patient was prepped and drapped in standard  cystoscopic fashion and 2% xylocaine jelly was used to anesthetize the urethra. A flexible cystoscope was passed into the bladder via the urethra.      Urethra appeared normal without any lesions or abnormalities. Bilateral ureteral orifices in orthotopic position on the trigone with clear efflux bilaterally.      The bladder was then systematically inspected. The bladder mucosa of the posterior anterior and lateral walls was free of any discrete lesions, tumors, or ulcerations though did have diffuse change consistent with inflammatory findings across anterior, lateral walls, and mid trigone not involving orifices. Did appear most consistent with a diffuse cystitis cystica or cystitis glandularis, with small punctate bullous lesions with scattered jen-like calcifications.  No gross papillary tumors or  lesions.     Given diffuse bladder involvement, cold cup biopsy forceps were passed through scope and used to take excisional biopsy of area of this abnormal mucosa, near left lateral wall. Bloody efflux was seen from this biopsy site, and therefore bugbee monopolar electrocautery was used to fulgurate this area until no efflux of blood seen.      The scope was then withdrawn just distal to bladder neck, and with flow of irrigation off, bladder and biopsy site inspected and no bloody efflux seen, so the scope was withdrawn.     She tolerated the procedure well with no complications.       Disposition:   Most suspicious for benign/inflammatory findings. Given these findings, and concern for recurrent urinary tract infections and new urgency which may be related, discussed and 90 day suppressive course of low-dose antibiotics.  Given age, would avoid use of nitrofurantoin, and therefore Ceftin 250 mg daily prophylaxis was prescribed for a 90 day course.  I did advise she take this twice a day for the next 48 hours as postprocedural prophylaxis.  I will call her to review biopsy results.  We did discuss the importance of daily bowel regimen, of which she had previous recommendations of mine in her purse, to facilitate decrease in recurrent infections as well as decrease in urgency.  She will continue mybetriq at this time.  Did discussed increasing hydration.  Prescribed one dose of Diflucan 150 mg should she get a yeast infection, though did advise her antibiotic use is approximately a quarter of the daily dose social service prophylaxis only.    We also re-reviewed conservative measures to control urgency and frequency including avoiding bladder irritants, timed voiding, not postponing voiding, and bowel regimen as distended bowel has extrinsic compressive effect on bladder. Discussed that bladder irritants include coffe (even decaf), tea, alcohol, soda, spicy foods, acidic juices (orange, tomato), vinegar, and  sugary beverages.    At this time we'll plan to see her back in 3 months at the end of her antibiotic prophylaxis regimen, after compliance to above recommendations and Mirabegron use.   We did discuss that when she is urologically stable, she would like to transition her urologic care to [place] for surveillance of her renal cysts.  She was post to get a baseline renal ultrasound today, but unfortunately due to scheduling error this was not possible, though advised not in urgency and can be done in [place] if and when needed.        Discharge:  DC home s/p uncomplicated outpatient procedure as above.  Diet - resume previous  Instructions - drink a lot of water, may see blood in urine, take abx as directed  Follow up: 3 months  Meds:     Medication List      START taking these medications    cefUROXime 250 MG tablet  Commonly known as:  CEFTIN  Take 1 tablet (250 mg total) by mouth once daily.     fluconazole 150 MG Tab  Commonly known as:  DIFLUCAN  Take 1 tablet (150 mg total) by mouth once daily.        CONTINUE taking these medications    acetaminophen 500 MG tablet  Commonly known as:  TYLENOL     albuterol sulfate 90 mcg/actuation Aepb     ALPRAZolam 1 MG tablet  Commonly known as:  XANAX     ascorbic acid (vitamin C) 1000 MG tablet  Commonly known as:  VITAMIN C     B COMPLEX-C-FOLIC ACID-ZN ORAL     fluticasone-vilanterol 100-25 mcg/dose diskus inhaler  Commonly known as:  BREO     mirabegron 50 mg Tb24  Take 1 tablet (50 mg total) by mouth once daily.     montelukast 10 mg tablet  Commonly known as:  SINGULAIR     nortriptyline 50 MG capsule  Commonly known as:  PAMELOR     nystatin 100,000 unit/mL suspension  Commonly known as:  MYCOSTATIN     pantoprazole 40 MG tablet  Commonly known as:  PROTONIX     phenazopyridine 200 MG tablet  Commonly known as:  PYRIDIUM     predniSONE 50 MG Tab  Commonly known as:  DELTASONE     simvastatin 40 MG tablet  Commonly known as:  ZOCOR     tiZANidine 4 mg Cap      vitamin A 62145 UNIT capsule     vitamin D 1000 units Tab     vitamin E 400 UNIT capsule     VOLTAREN 1 % Gel  Generic drug:  diclofenac sodium        STOP taking these medications    nitrofurantoin (macrocrystal-monohydrate) 100 MG capsule  Commonly known as:  MACROBID           Where to Get Your Medications      These medications were sent to [id number] - [place], [place] - [address] [place] [id number]    Phone:  [phone]   · cefUROXime 250 MG tablet  · fluconazole 150 MG Tab

## 2018-01-02 ENCOUNTER — TELEPHONE (OUTPATIENT)
Dept: UROLOGY | Facility: CLINIC | Age: 78
End: 2018-01-02

## 2018-01-02 NOTE — TELEPHONE ENCOUNTER
----- Message from ePggy Barrios sent at 1/2/2018 10:58 AM CST -----  Patient had been out of town and is now returning office call concerning her biopsy. Please call back with results at [phone].

## 2018-01-02 NOTE — TELEPHONE ENCOUNTER
Returned pt's call. Result of biopsy given - negative. Pt has been taking her abx as prescribed and f/u appt scheduled for 3/12/18.

## 2018-01-28 NOTE — PROGRESS NOTES
[facility] Urology Progress Note    Denisa Virgen is a 77 y.o. female who presents for follow-up of urge incontinence and hematuria     She was initially seen by MELINDA Hunt on 11/17/17 at the referral of Brianda Simmons NP. She has reportedly had recurrent UTIs (last E Coli on 8/5/17) and had recent gross hematuria. She had been given macrobid 14d course at that time after seeing red discoloration in urine. Udip in clinic had moderate blood. Ucx negative. She did also c/o urgency and urge 1-2 daily, and had been taking oxybutynin 5mg daily by report but was unsure of if taking it. Also noted constipation.  Cytology negative. CT urogram was ordered and demonstrated left renal hemorrhagic cysts, confirmed on MRI.  No further hematuria since completing macrobid. No family history  malignancy. Quit smoking 40 years ago after 10 years of smoking. No chemical exposures     She did report urgency all the time for about 1 month prior to my evaluation, to the point she bought pads and was using about 9-10 per day. UUI with most voids.  Reports recurrent UTIs which then become yeast infections. Has had cultures with MELINDA Simmons and at [facility] in [place].  ++ constipation - may be up to 2 weeks between BMs x3-4 mos. Occasional laxative. Had increased water intake. No bowel regimen.  1-2 cups coffee/am, 1 cup about 4pm, occ tea. Bad dry mouth. No dysuria. Questionably taking oxybutynin.  Discussed conservative measures to control urgency and frequency, and advised to limit bladder irritants and provided bowel regimen.  Discontinued oxybutynin and started Myrbetriq.  Given her gross hematuria and recurrent UTIs with new bothersome urgency and urge incontinence, performed cystoscopy 12/12/17.      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  No gross papillary tumors.  Biopsy taken which revealed cystitis cystica, and was negative for atypia or malignancy.  I prescribed a 90 day suppressive course of low-dose daily antibiotics, and elected to use Ceftin 250mg daily given potential complications of chronic nitrofurantoin use in her age group.    She returns today noting continued bothersome urgent continence.  She is no longer having to wear pads every day, though yesterday for example what herself 3 times.  She has urgency with 90% of her voids.  Once, upon standing up she had urinary incontinence without warning, though notes this is rare.  Usually she has warning, and when she feels the need to urinate she gets up to go to the bathroom she will leak.  She is drinking mostly water and 1 glass of great use a day.  She cut out her afternoon coffee and is down to 1.5 cups in the morning  For a while her bowel movements were improved on MiraLAX at night, though she has cut down.  She notes that her stool is still soft but doesn't seem to want to move.  Bowel movements are now every 2-3 days, though she still has to push and strain, also has hemorrhoids.  She has run out of her stool softener.  She is taking her Myrbetriq  Urinalysis dipstick today is 1+ leukocytes and trace protein.  Postvoid residual by bladder scan is 50 cc    ROS: A comprehensive 10 system review was performed and is negative except as noted above in HPI    PHYSICAL EXAM:    Vitals:    01/29/18 0934   BP: 110/69   Pulse: 98   Resp: 18     Body mass index is 25.84 kg/m². Weight: 77.1 kg (169 lb 15.6 oz) Height: 5' 8" (172.7 cm)       General: Alert, cooperative, no distress, appears stated age   Head: Normocephalic, without obvious abnormality, atraumatic   Eyes: PERRL, conjunctiva/corneas clear   Lungs: Respirations unlabored   Heart: Warm and well perfused   Abdomen: soft NT ND  Extremities: Extremities normal, atraumatic, no cyanosis or edema   Skin: Skin color, " texture, turgor normal, no rashes or lesions   Psych: Appropriate   Neurologic: Non-focal       Recent Results (from the past 336 hour(s))   POCT URINE DIPSTICK WITHOUT MICROSCOPE    Collection Time: 01/29/18  9:45 AM   Result Value Ref Range    Color, UA clear     Spec Grav UA 1.015     pH, UA 5.0     WBC, UA 1+     Nitrite, UA neg     Protein trace'     Glucose, UA neg     Ketones, UA neg     Urobilinogen, UA neg     Bilirubin neg     Blood, UA neg    POCT Bladder Scan    Collection Time: 01/29/18  9:45 AM   Result Value Ref Range    POC Residual Urine Volume 50 0 - 100 mL       ASSESSMENT   1. Urge incontinence of urine  POCT URINE DIPSTICK WITHOUT MICROSCOPE    POCT Bladder Scan    Urine culture       Plan    Though still bothered by her urgency and urge incontinence, she has had interval improvement.  We did again discuss that some juices such as grape juices are very sugary and also fallen to the bladder irritants category.  She has made improvements in her bowel regimen, as she previously went weeks without having a bowel movement.  She had run out of her stool softener and was only taking MiraLAX on occasion and still having trouble emptying.  We did discuss resuming a stool softener, using prunes and prune juice, and considering Metamucil fiber supplements, and discussed all OTC components of the bowel regimen for which she will need to titrate and find the right combination to produce a soft daily bowel movement.  Encouraged continued efforts at times voiding and adherent to all conservative measures as discussed, as well as continuing Myrbetriq.  She has been compliant with her daily antibiotic prophylaxis, though with continued incontinence including one episode of incontinence without awareness, will send urine for culture to make sure no infection is present.  She can keep her originally scheduled follow-up as planned    Instructions provided to patient as below:  Measures to control urgency and  frequency:  1. avoiding bladder irritants  - Discussed bladder irritants include coffe (even decaf), tea, alcohol, soda, spicy foods, acidic juices (orange, tomato), vinegar, and artificial sweeteners/sugary beverages.  2. timed voiding - go by the clock even if dont feel like you have to (every 2 hours maximum if even making it that long - otherwise try 90 minutes, and increase interval as tolerated), as well as when you need to go   3. No postponing voiding - dont hold it! Go when the need comes on  4. No fluids 2 hours before bed  5. Urinate just before bed   Continue myrbetriq  Bowel regimen - improve - goal: soft daily BM without pushin  - miralax daily - continue  - prunes - continue eating daily  CAN ADD any or all of  - stool softener capsule - 1-2x/day  - metamucil - 1 dose per day (or other fiber supplement)

## 2018-01-29 ENCOUNTER — OFFICE VISIT (OUTPATIENT)
Dept: UROLOGY | Facility: CLINIC | Age: 78
End: 2018-01-29
Payer: MEDICARE

## 2018-01-29 VITALS
WEIGHT: 170 LBS | DIASTOLIC BLOOD PRESSURE: 69 MMHG | BODY MASS INDEX: 25.76 KG/M2 | HEART RATE: 98 BPM | RESPIRATION RATE: 18 BRPM | SYSTOLIC BLOOD PRESSURE: 110 MMHG | HEIGHT: 68 IN

## 2018-01-29 DIAGNOSIS — N39.41 URGE INCONTINENCE OF URINE: Primary | ICD-10-CM

## 2018-01-29 LAB
BILIRUB SERPL-MCNC: ABNORMAL MG/DL
BLOOD URINE, POC: ABNORMAL
COLOR, POC UA: CLEAR
GLUCOSE UR QL STRIP: ABNORMAL
KETONES UR QL STRIP: ABNORMAL
LEUKOCYTE ESTERASE URINE, POC: ABNORMAL
NITRITE, POC UA: ABNORMAL
PH, POC UA: 5
POC RESIDUAL URINE VOLUME: 50 ML (ref 0–100)
PROTEIN, POC: ABNORMAL
SPECIFIC GRAVITY, POC UA: 1.01
UROBILINOGEN, POC UA: ABNORMAL

## 2018-01-29 PROCEDURE — 87186 SC STD MICRODIL/AGAR DIL: CPT

## 2018-01-29 PROCEDURE — 81002 URINALYSIS NONAUTO W/O SCOPE: CPT | Mod: PBBFAC,PN | Performed by: UROLOGY

## 2018-01-29 PROCEDURE — 99214 OFFICE O/P EST MOD 30 MIN: CPT | Mod: S$PBB,,, | Performed by: UROLOGY

## 2018-01-29 PROCEDURE — 1126F AMNT PAIN NOTED NONE PRSNT: CPT | Mod: ,,, | Performed by: UROLOGY

## 2018-01-29 PROCEDURE — 1159F MED LIST DOCD IN RCRD: CPT | Mod: ,,, | Performed by: UROLOGY

## 2018-01-29 PROCEDURE — 99999 PR PBB SHADOW E&M-EST. PATIENT-LVL IV: CPT | Mod: PBBFAC,,, | Performed by: UROLOGY

## 2018-01-29 PROCEDURE — 87086 URINE CULTURE/COLONY COUNT: CPT

## 2018-01-29 PROCEDURE — 87077 CULTURE AEROBIC IDENTIFY: CPT

## 2018-01-29 PROCEDURE — 99214 OFFICE O/P EST MOD 30 MIN: CPT | Mod: PBBFAC,PN,25 | Performed by: UROLOGY

## 2018-01-29 PROCEDURE — 51798 US URINE CAPACITY MEASURE: CPT | Mod: PBBFAC,PN | Performed by: UROLOGY

## 2018-01-29 PROCEDURE — 87088 URINE BACTERIA CULTURE: CPT

## 2018-01-29 RX ORDER — KETOCONAZOLE 20 MG/ML
SHAMPOO, SUSPENSION TOPICAL
COMMUNITY
Start: 2018-01-15 | End: 2019-01-02

## 2018-01-29 RX ORDER — FLUTICASONE PROPIONATE 50 MCG
SPRAY, SUSPENSION (ML) NASAL
COMMUNITY
Start: 2018-01-23

## 2018-01-29 RX ORDER — TRIAMCINOLONE ACETONIDE 1 MG/G
CREAM TOPICAL
COMMUNITY
Start: 2018-01-10 | End: 2019-01-02

## 2018-01-29 NOTE — LETTER
February 3, 2018        Saud Philippe MD  [address] [facility] [place] [address]             [facility] Urology  [address] [place] [address] Dr. Johnson  [facility] [address]  Phone: [phone]  Fax: [phone]   Patient: Denisa Virgen   MR Number: [id number]   YOB: 1940   Date of Visit: 1/29/2018       Dear Dr. Philippe:    The patient, Ms. Denisa Virgen today for follow-up of her incontinence. Attached you will find relevant portions of my assessment and plan of care.    If you have questions, please do not hesitate to call me. I look forward to following Denisa Virgen along with you.    Sincerely,        Reza Cole MD            CC  No Recipients    Enclosure

## 2018-01-30 RX ORDER — SULFAMETHOXAZOLE AND TRIMETHOPRIM 800; 160 MG/1; MG/1
1 TABLET ORAL 2 TIMES DAILY
Qty: 14 TABLET | Refills: 0 | Status: SHIPPED | OUTPATIENT
Start: 2018-01-30 | End: 2018-02-06

## 2018-01-30 RX ORDER — SULFAMETHOXAZOLE AND TRIMETHOPRIM 400; 80 MG/1; MG/1
1 TABLET ORAL 2 TIMES DAILY
Qty: 60 TABLET | Refills: 0 | Status: SHIPPED | OUTPATIENT
Start: 2018-01-30 | End: 2018-07-05

## 2018-01-31 ENCOUNTER — TELEPHONE (OUTPATIENT)
Dept: UROLOGY | Facility: CLINIC | Age: 78
End: 2018-01-31

## 2018-01-31 LAB — BACTERIA UR CULT: NORMAL

## 2018-01-31 NOTE — TELEPHONE ENCOUNTER
Spoke with pt.  U/A results reviewed.  Instructed patient to stop ceftin and start bactrim immediately - 2 rxs sent to pharmacy   A. Bactrim DS (800/160) BID x7days - should start immediately and complete 7d course of BID first   B. Once complete, start single strength septra (bactrim /80) once daily as your new prophylaxis, with 2 months worth sent in. Verbal understanding.

## 2018-01-31 NOTE — TELEPHONE ENCOUNTER
----- Message from Reza Cole MD sent at 1/30/2018 11:03 PM CST -----  1. Please notify patient she has a UTI which may be why her urgency/incontinence are worse. - This is a breakthrough UTI despite daily ceftin prophylaxis  2. Please notify patient to stop ceftin and start bactrim immediately - 2 rxs sent to pharmacy  A. Bactrim DS (800/160) BID x7days - should start immediately and complete 7d course of BID first  B. Once that is complete, should start single strength septra (bactrim /80) once daily as her new prophylaxis and 2 months worth sent in.

## 2018-02-01 ENCOUNTER — TELEPHONE (OUTPATIENT)
Dept: UROLOGY | Facility: CLINIC | Age: 78
End: 2018-02-01

## 2018-02-01 NOTE — TELEPHONE ENCOUNTER
Pt notifed Diflucan 150 mg called to [facility] pharmacy as requested.  Instructed to call back if no improvement.

## 2018-02-01 NOTE — TELEPHONE ENCOUNTER
----- Message from Renny Flores MA sent at 2/1/2018  8:29 AM CST -----  Contact: Pt  Pt called and would like a nurse to give her a call back. Ms Denisa Virgen would like Dr Cole to order her something for a yeast infection.    Pt can be reached at [phone].    Thanks.

## 2018-02-02 ENCOUNTER — TELEPHONE (OUTPATIENT)
Dept: UROLOGY | Facility: CLINIC | Age: 78
End: 2018-02-02

## 2018-02-02 NOTE — TELEPHONE ENCOUNTER
----- Message from Loida Abad sent at 2/2/2018 12:00 PM CST -----  Contact: Denisa Lazcano is returning phone call. Please contact patient back at [phone] (home)   Calling about which medication to take and which to stop!

## 2018-02-02 NOTE — TELEPHONE ENCOUNTER
Spoke with patient, patient informed of the medications to start and stop. Please refer back to the previous telephone encounter from the results note.

## 2018-02-13 ENCOUNTER — TELEPHONE (OUTPATIENT)
Dept: UROLOGY | Facility: CLINIC | Age: 78
End: 2018-02-13

## 2018-02-14 NOTE — TELEPHONE ENCOUNTER
Review of outside urine from [facility]    11/16/17:  UA: negative/ Ucx negative    8/15/17:  UA negative but Ucx + >100k EColi  R: amp, gent, piperacillin, tetracycline, bactrim  S: cefepime, ceftriaxone, cefuroxime, FQs, penems

## 2018-03-12 ENCOUNTER — OFFICE VISIT (OUTPATIENT)
Dept: UROLOGY | Facility: CLINIC | Age: 78
End: 2018-03-12
Payer: MEDICARE

## 2018-03-12 VITALS
WEIGHT: 171.88 LBS | BODY MASS INDEX: 26.05 KG/M2 | DIASTOLIC BLOOD PRESSURE: 85 MMHG | HEIGHT: 68 IN | HEART RATE: 106 BPM | TEMPERATURE: 98 F | SYSTOLIC BLOOD PRESSURE: 129 MMHG

## 2018-03-12 DIAGNOSIS — N39.41 URGE INCONTINENCE OF URINE: Primary | ICD-10-CM

## 2018-03-12 PROCEDURE — 99213 OFFICE O/P EST LOW 20 MIN: CPT | Mod: PBBFAC,PN | Performed by: UROLOGY

## 2018-03-12 PROCEDURE — 99999 PR PBB SHADOW E&M-EST. PATIENT-LVL III: CPT | Mod: PBBFAC,,, | Performed by: UROLOGY

## 2018-03-12 PROCEDURE — 99214 OFFICE O/P EST MOD 30 MIN: CPT | Mod: S$PBB,,, | Performed by: UROLOGY

## 2018-03-12 NOTE — PROGRESS NOTES
[facility] Urology Progress Note    Denisa Virgen is a 77 y.o. female who presents for follow-up of urge incontinence     She was initially seen by MELINDA Hunt on 11/17/17 at the referral of Brianda Simmons NP. She has reportedly had recurrent UTIs (last E Coli on 8/5/17) and had recent gross hematuria. She had been given macrobid 14d course at that time after seeing red discoloration in urine. Udip in clinic had moderate blood. Ucx negative. She did also c/o urgency and urge 1-2 daily, and had been taking oxybutynin 5mg daily by report but was unsure of if taking it. Also noted constipation.  Cytology negative. CT urogram was ordered and demonstrated left renal hemorrhagic cysts, confirmed on MRI.  No further hematuria since completing macrobid. No family history  malignancy. Quit smoking 40 years ago after 10 years of smoking. No chemical exposures     She did report urgency all the time for about 1 month prior to my evaluation, to the point she bought pads and was using about 9-10 per day. UUI with most voids.  Reports recurrent UTIs which then become yeast infections. Has had cultures with MELINDA Simmons and at [facility] in [place].  ++ constipation - may be up to 2 weeks between BMs x3-4 mos. Occasional laxative. Had increased water intake. No bowel regimen.  1-2 cups coffee/am, 1 cup about 4pm, occ tea. Bad dry mouth. No dysuria. Questionably taking oxybutynin.  Discussed conservative measures to control urgency and frequency, and advised to limit bladder irritants and provided bowel regimen.  Discontinued oxybutynin and started Myrbetriq.  Given her gross hematuria and recurrent UTIs with new bothersome urgency and urge incontinence, performed cystoscopy 12/12/17.       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   No gross papillary tumors.  Biopsy taken which revealed cystitis cystica, and was negative for atypia or malignancy.  I prescribed a 90 day suppressive course of low-dose daily antibiotics, and elected to use Ceftin 250mg daily given potential complications of chronic nitrofurantoin use in her age group.    I last saw her 2/13/18 noting continued bothersome urgent continence. She is no longer having to wear pads every day, though still wetting herself on occasion.   Urgency with 90% of her voids.  Rarely upon standing up has  urinary incontinence without warning. Usually with urge to go will leak on the way  She is drinking mostly water and.  She cut out her afternoon coffee and is down to 1.5 cups in the morning  For a while her bowel movements were improved on MiraLAX at night, though she has cut down.  She notes that her stool is still soft but doesn't seem to want to move.  Q 2-3 day BM, though she still has to push and strain, also has hemorrhoids.    She has run out of her stool softener. She is taking her Myrbetriq  Urinalysis dipstick today is 1+ leukocytes and trace protein.  Postvoid residual by bladder scan is 50 cc    She returns today after increased effort at conservative measures, improved bowel regimen, and completing abx, and continuing myrbetriq  Urinalysis dipstick today is negative.  She still reports that she is urinating too much.  She has urgency with all voids.  DTF 6-7 times per day.  NTF 4 times.  Urge incontinent related accident often, 3 times yesterday.  She finishes her prophylactic Ceftin today.  Still taking Myrbetriq.  Still drinking 2 cups of coffee in the morning.  Her bowel movements are under control with MiraLAX and stool softeners but still occasionally hard to push out.  She does not wear a pad unless she has to.  Urinalysis dipstick today is negative.  Postvoid residual by bladder scan is 15 cc.        ROS: A comprehensive 10 system review was performed and is negative  "except as noted above in HPI    PHYSICAL EXAM:    Vitals:    03/12/18 1458   BP: 129/85   Pulse: 106   Temp: 97.7 °F (36.5 °C)     Body mass index is 26.13 kg/m². Weight: 78 kg (171 lb 13.6 oz) Height: 5' 8" (172.7 cm)       General: Alert, cooperative, no distress, appears stated age   Head: Normocephalic, without obvious abnormality, atraumatic   Eyes: PERRL, conjunctiva/corneas clear   Lungs: Respirations unlabored   Heart: Warm and well perfused   Abdomen: soft NT ND  Extremities: Extremities normal, atraumatic, no cyanosis or edema   Skin: Skin color, texture, turgor normal, no rashes or lesions   Psych: Appropriate   Neurologic: Non-focal       No results found for this or any previous visit (from the past 336 hour(s)).    ASSESSMENT   1. Urge incontinence of urine         Plan    She has been compliant with Myrbetriq.  Has previously tried anticholinergics, and would avoid them in this 77-year-old patient due to both age and her chronic constipation.  She has adhered to conservative measures.  She has been on suppressive low-dose daily antibiotics for inflammatory bladder findings.  She still has refractory urgency with urge incontinence despite medical therapy and conservative measures.  We did discuss third line therapy is at this time including Botox and sacral neuromodulation and PTNS.  She is not interested in invasive surgical therapy or repetitive procedures given her distance of travel.  We did therefore focus on Botox.  We did discuss all risks and benefits, especially the risk of urinary retention and need for catheterization or self-catheterization, which she reports she would have no trouble with.  She has good manual dexterity and is willing.  We did also discuss need for repeat procedures, though on average it lasts approximately 6-9 months.  She is interested but would like to discuss with her family and reconsider.  Handouts/information of been provided.  She will call to schedule if she would " like to proceed.

## 2018-05-09 ENCOUNTER — TELEPHONE (OUTPATIENT)
Dept: UROGYNECOLOGY | Facility: CLINIC | Age: 78
End: 2018-05-09

## 2018-05-09 NOTE — TELEPHONE ENCOUNTER
----- Message from Reza Cole MD sent at 5/8/2018 10:03 PM CDT -----  Regarding: FW: botox  Please reach out to ms rolle and see how her urgency/urge incontinence is controlled and if she has considered botox    ----- Message -----  From: Reza Cole MD  Sent: 3/17/2018  12:14 PM  To: Reza Cole MD  Subject: botox

## 2018-05-09 NOTE — TELEPHONE ENCOUNTER
attempted to contact patient, I did leave a voicemail for the patient to contact the office as well as provided a call back number for the office.

## 2018-07-05 ENCOUNTER — OFFICE VISIT (OUTPATIENT)
Dept: RHEUMATOLOGY | Facility: CLINIC | Age: 78
End: 2018-07-05
Payer: MEDICARE

## 2018-07-05 VITALS
SYSTOLIC BLOOD PRESSURE: 134 MMHG | WEIGHT: 173.81 LBS | DIASTOLIC BLOOD PRESSURE: 82 MMHG | BODY MASS INDEX: 26.43 KG/M2

## 2018-07-05 DIAGNOSIS — Z79.899 ENCOUNTER FOR LONG-TERM (CURRENT) USE OF MEDICATIONS: ICD-10-CM

## 2018-07-05 DIAGNOSIS — M19.90 ACUTE ARTHRITIS: Primary | ICD-10-CM

## 2018-07-05 DIAGNOSIS — M15.9 PRIMARY OSTEOARTHRITIS INVOLVING MULTIPLE JOINTS: ICD-10-CM

## 2018-07-05 LAB
ALBUMIN SERPL-MCNC: 4.1 G/DL (ref 3.1–4.7)
ALP SERPL-CCNC: 71 IU/L (ref 40–104)
ALT (SGPT): 21 IU/L (ref 3–33)
AST SERPL-CCNC: 23 IU/L (ref 10–40)
BASOPHILS NFR BLD: 0 K/UL (ref 0–0.2)
BASOPHILS NFR BLD: 0.5 %
BILIRUB SERPL-MCNC: 0.5 MG/DL (ref 0.3–1)
BUN SERPL-MCNC: 11 MG/DL (ref 8–20)
CALCIUM SERPL-MCNC: 9.6 MG/DL (ref 7.7–10.4)
CHLORIDE: 103 MMOL/L (ref 98–110)
CO2 SERPL-SCNC: 27.3 MMOL/L (ref 22.8–31.6)
CREATININE: 0.7 MG/DL (ref 0.6–1.4)
CRP SERPL-MCNC: 0.05 MG/DL (ref 0–1.4)
EOSINOPHIL NFR BLD: 0.1 K/UL (ref 0–0.7)
EOSINOPHIL NFR BLD: 0.8 %
ERYTHROCYTE [DISTWIDTH] IN BLOOD BY AUTOMATED COUNT: 12.5 % (ref 11.7–14.9)
GLUCOSE: 101 MG/DL (ref 70–99)
GRAN #: 4.8 K/UL (ref 1.4–6.5)
GRAN%: 75.7 %
HCT VFR BLD AUTO: 41.8 % (ref 36–48)
HGB BLD-MCNC: 13.9 G/DL (ref 12–15)
IMMATURE GRANS (ABS): 0 K/UL (ref 0–1)
IMMATURE GRANULOCYTES: 0.2 %
LYMPH #: 1.2 K/UL (ref 1.2–3.4)
LYMPH%: 18.5 %
MCH RBC QN AUTO: 32.6 PG (ref 25–35)
MCHC RBC AUTO-ENTMCNC: 33.3 G/DL (ref 31–36)
MCV RBC AUTO: 97.9 FL (ref 79–98)
MONO #: 0.3 K/UL (ref 0.1–0.6)
MONO%: 4.3 %
NUCLEATED RBCS: 0 %
PLATELET # BLD AUTO: 199 K/UL (ref 140–440)
PMV BLD AUTO: 11.3 FL (ref 8.8–12.7)
POTASSIUM SERPL-SCNC: 4.2 MMOL/L (ref 3.5–5)
PROT SERPL-MCNC: 6.8 G/DL (ref 6–8.2)
RBC # BLD AUTO: 4.27 M/UL (ref 3.5–5.5)
SODIUM: 138 MMOL/L (ref 134–144)
URATE SERPL-MCNC: 3.6 MG/DL (ref 2.6–7.8)
WBC # BLD AUTO: 6.3 K/UL (ref 5–10)

## 2018-07-05 PROCEDURE — 20610 DRAIN/INJ JOINT/BURSA W/O US: CPT | Mod: RT,,, | Performed by: INTERNAL MEDICINE

## 2018-07-05 PROCEDURE — 99202 OFFICE O/P NEW SF 15 MIN: CPT | Mod: 25,,, | Performed by: INTERNAL MEDICINE

## 2018-07-05 RX ORDER — TRIAMCINOLONE ACETONIDE 40 MG/ML
40 INJECTION, SUSPENSION INTRA-ARTICULAR; INTRAMUSCULAR
Status: DISCONTINUED | OUTPATIENT
Start: 2018-07-05 | End: 2018-07-05 | Stop reason: HOSPADM

## 2018-07-05 RX ORDER — DEXAMETHASONE SODIUM PHOSPHATE 4 MG/ML
2 INJECTION, SOLUTION INTRA-ARTICULAR; INTRALESIONAL; INTRAMUSCULAR; INTRAVENOUS; SOFT TISSUE
Status: DISCONTINUED | OUTPATIENT
Start: 2018-07-05 | End: 2018-07-05 | Stop reason: HOSPADM

## 2018-07-05 RX ORDER — NICOTINE POLACRILEX 2 MG
GUM BUCCAL
COMMUNITY
End: 2019-01-02

## 2018-07-05 RX ORDER — OLOPATADINE HYDROCHLORIDE 1 MG/ML
SOLUTION/ DROPS OPHTHALMIC
COMMUNITY
Start: 2018-06-06 | End: 2019-01-02

## 2018-07-05 RX ADMIN — DEXAMETHASONE SODIUM PHOSPHATE 2 MG: 4 INJECTION, SOLUTION INTRA-ARTICULAR; INTRALESIONAL; INTRAMUSCULAR; INTRAVENOUS; SOFT TISSUE at 02:07

## 2018-07-05 RX ADMIN — TRIAMCINOLONE ACETONIDE 40 MG: 40 INJECTION, SUSPENSION INTRA-ARTICULAR; INTRAMUSCULAR at 02:07

## 2018-07-05 NOTE — PROCEDURES
Large Joint Aspiration/Injection  Date/Time: 7/5/2018 2:30 PM  Performed by: DARLYN BOLES  Authorized by: DARLYN BOLES     Consent Done?:  Not Needed  Indications:  Joint swelling    Location:  Knee  Site:  R knee  Medications:  40 mg triamcinolone acetonide 40 mg/mL; 2 mg dexamethasone 4 mg/mL  Aspirate amount (ml):  6  Aspirate:  Clear  Lab: Fluid sent for laboratory analysis    Patient tolerance:  Patient tolerated the procedure well with no immediate complications    Injected with 1 cc Kenalog and 1 cc dexamethasone.

## 2018-07-05 NOTE — PROGRESS NOTES
[facility] RHEUMATOLOGY        NEW PATIENT      Subjective:       Patient ID:   NAME: Denisa Virgen : 1940     78 y.o. female    Referring Doc: No ref. provider found  Other Physicians:    Chief Complaint:  Joint Pain (Knee is buckling on her)      History of Present Illness:     New patient with hx of OA,seen 10 yrs ago.  CO of pain R knee for last 6 wks.  No hx trauma,fevers or chills.  Pain in r 1 CMC joint with swelling on and off          ROS:   GEN: no fevers night sweats or significant weight changes  +  Fatigue ,for last yr.( sleeps well)  HEENT: no HA's, changes in vision , no mouth ulcers, no sicca symptoms except mouth , no scalp tenderness, jaw claudication  CV: no CP,+ SOB, PND,+ YOON or orthopnea,no palpitations  PULM:no SOB, cough, hemoptysis, sputum or pleuritic pain  GI: no abdominal pain, nausea, vomiting,+ constipation, diarrhea, melanotic stools, BRBPR, or hematemesis, no dysphagia  : no hematuria, dysuria  NEURO:no paresthesias, headaches, visual disturbances, muscle weakness  SKIN:  no rashes , erythema, bruising, or swelling, no Raynauds, no photosensitivity  MUSCULOSKELETAL:no joint swelling, no prolonged AM stiffness, + low  back pain for years.  PSYCH:    Insomnia,   depression,  anxiety    Medications:    Current Outpatient Prescriptions:     ALPRAZolam (XANAX) 1 MG tablet, Take 0.4mg  QHS by oral route for 30 days., Disp: , Rfl:     ascorbic acid, vitamin C, (VITAMIN C) 1000 MG tablet, Take 1,000 mg by mouth., Disp: , Rfl:     biotin 1 mg Cap, biotin  5,000 mcg 2 caps q hs, Disp: , Rfl:     fluticasone (FLONASE) 50 mcg/actuation nasal spray, , Disp: , Rfl:     fluticasone-vilanterol (BREO) 100-25 mcg/dose diskus inhaler, 1 puff once a day, Disp: , Rfl:     ketoconazole (NIZORAL) 2 % shampoo, , Disp: , Rfl:     montelukast (SINGULAIR) 10 mg tablet, TAKE ONE TABLET BY MOUTH ONCE DAILY, Disp: , Rfl:     nortriptyline (PAMELOR) 50 MG capsule, Take 2 capsules as needed by  oral route at bedtime as needed., Disp: , Rfl:     olopatadine (PATANOL) 0.1 % ophthalmic solution, , Disp: , Rfl:     simvastatin (ZOCOR) 40 MG tablet, TAKE ONE TABLET BY MOUTH ONCE DAILY, Disp: , Rfl:     triamcinolone acetonide 0.1% (KENALOG) 0.1 % cream, , Disp: , Rfl:     VIT BCOMP,C/FOLIC ACID/ZINC (B COMPLEX-C-FOLIC ACID-ZN ORAL), Take 2 tablets by mouth., Disp: , Rfl:     vitamin A 03364 UNIT capsule, Take 10,000 Units by mouth., Disp: , Rfl:     vitamin D 1000 units Tab, Take 1,000 Units by mouth., Disp: , Rfl:     vitamin E 400 UNIT capsule, Take 1 capsule every day by oral route., Disp: , Rfl:     vitamins  A,C,E-zinc-copper (PRESERVISION AREDS) 14,320-226-200 unit-mg-unit Cap, PreserVision AREDS  2 tabs qhs, Disp: , Rfl:     tiZANidine 4 mg Cap, TAKE TWO TABLETS BY MOUTH EVERY 8 HOURS AS NEEDED, Disp: , Rfl:     FAMILY HISTORY: negative for Connective Tissue Disease      PAST MEDICAL HISTORY:COPD    PAST SURGICAL HISTORY:see record    SOCIAL HISTORY:Lives alone    ALLERGIES:levofloxacin          Objective:     Vitals:  Blood pressure 134/82, weight 78.8 kg (173 lb 12.8 oz).    Physical Examination:   GEN: no apparent distress, comfortable; AAOx3  SKIN: no rashes, no lesions, no sclerodactyly or induration, no Raynaud's, no periungual erythema  HEAD: normal  EYES: + pallor, no icterus,  NECK: no masses, thyroid normal, trachea midline, no LAD/LN's, supple  CV:   S1 and S2 regular, no murmurs, gallop or rubs  CHEST: Normal respiratory effort;  normal breath sounds; no rubs, no wheezes, no crackles.   ABDOM: nontender and nondistended; soft; ; no rebound/guarding,no masses  MUSC/Skeletal: ROM normal; no crepitus; joints without synovitis, no deformities .Right knee with mild effusion. No erythema.  EXTREM: no clubbing, cyanosis, edema, normal pulses.  NEURO: grossly intact; motorWNL; AAOx3; no tremors  PSYCH: normal mood, affect and behavior  LYMPH: normal cervical, supraclavicular            Labs:    @RESUFAST(WBC,HGB,HCT,MCV,PLT)  )@RESUFAST(NA,K,CL,CO2,GLU,BUN,Creatinine,Calcium,PROT,Albumin,Bilitot,Alkphos,AST,ALT,MERRICK,Sed Rate,CRP,RF,CCP)      Radiology/Diagnostic Studies:    I have reviewed all available labs and XRay reports    Assessment/Plan:   78 y.o. female with acute arthritis of the right knee. This was aspirated and injected as per procedure note  2) Pallor and YOON : will check CBC,CMP  See PCP              Discussion:     I have explained all of the above in detail and the patient understands all of the current recommendation(s). I have answered all of their questions to the best of my ability and to their complete satisfaction.      I have reviewed the risks and benefits of the medication in detail with patient, who understands and wishes to proceed. Printed information regarding the disease and/or medication was also provided.      4 wks or before prn  RTC        Electronically signed by Mohinder Ordonez MD

## 2018-08-14 ENCOUNTER — OFFICE VISIT (OUTPATIENT)
Dept: RHEUMATOLOGY | Facility: CLINIC | Age: 78
End: 2018-08-14
Payer: MEDICARE

## 2018-08-14 VITALS — DIASTOLIC BLOOD PRESSURE: 84 MMHG | BODY MASS INDEX: 26.53 KG/M2 | WEIGHT: 174.5 LBS | SYSTOLIC BLOOD PRESSURE: 142 MMHG

## 2018-08-14 DIAGNOSIS — M15.9 PRIMARY OSTEOARTHRITIS INVOLVING MULTIPLE JOINTS: Primary | ICD-10-CM

## 2018-08-14 PROCEDURE — 99213 OFFICE O/P EST LOW 20 MIN: CPT | Mod: ,,, | Performed by: INTERNAL MEDICINE

## 2018-08-14 RX ORDER — DICLOFENAC SODIUM 10 MG/G
2 GEL TOPICAL DAILY
Qty: 1 TUBE | Refills: 1 | Status: SHIPPED | OUTPATIENT
Start: 2018-08-14 | End: 2019-01-02 | Stop reason: SDUPTHER

## 2018-08-14 NOTE — PROGRESS NOTES
[facility] RHEUMATOLOGY            PROGRESS NOTE      Subjective:       Patient ID:   NAME: Denisa Virgen : 1940     78 y.o. female    Referring Doc: No ref. provider found  Other Physicians:    Chief Complaint:  Acute arthritis (Right Knee swollen still)      History of Present Illness:     Patient returns today for a regularly scheduled follow-up visit for acute arthritis in the right knee. This was injected on her last visit.     The patient  She is  doing well. No recurrence of swelling. There is mild pain after standing or walking for a while.          ROS:   GEN:  No  fever, night sweats . weight is stable   + some fatigue  SKIN: no rashes, no bruising, no ulcerations, no Raynaud's  HEENT: no HA's, No visual changes, no mucosal ulcers, no sicca symptoms,  CV:   no CP, SOB, PND, YOON, no orthopnea, no palpitations  PULM: normal with no SOB, cough, hemoptysis, sputum or pleuritic pain  GI:  no abdominal pain, nausea, vomiting, constipation, diarrhea, melanotic stools, BRBPR, hematemesis, no dysphagia  :   no dysuria  NEURO: no paresthesias, headaches, visual disturbances, muscle weakness  MUSCULOSKELETAL:no joint swelling, prolonged AM stiffness, no back pain, no muscle pain  Allergies:  Review of patient's allergies indicates:   Allergen Reactions    Levofloxacin Rash       Medications:    Current Outpatient Medications:     ALPRAZolam (XANAX) 1 MG tablet, Take 0.4mg  QHS by oral route for 30 days., Disp: , Rfl:     ascorbic acid, vitamin C, (VITAMIN C) 1000 MG tablet, Take 1,000 mg by mouth., Disp: , Rfl:     biotin 1 mg Cap, biotin  5,000 mcg 2 caps q hs, Disp: , Rfl:     fluticasone (FLONASE) 50 mcg/actuation nasal spray, , Disp: , Rfl:     fluticasone-vilanterol (BREO) 100-25 mcg/dose diskus inhaler, 1 puff once a day, Disp: , Rfl:     ketoconazole (NIZORAL) 2 % shampoo, , Disp: , Rfl:     montelukast (SINGULAIR) 10 mg tablet, TAKE ONE TABLET BY MOUTH ONCE DAILY, Disp: , Rfl:      nortriptyline (PAMELOR) 50 MG capsule, Take 2 capsules as needed by oral route at bedtime as needed., Disp: , Rfl:     olopatadine (PATANOL) 0.1 % ophthalmic solution, , Disp: , Rfl:     simvastatin (ZOCOR) 40 MG tablet, TAKE ONE TABLET BY MOUTH ONCE DAILY, Disp: , Rfl:     tiZANidine 4 mg Cap, TAKE TWO TABLETS BY MOUTH EVERY 8 HOURS AS NEEDED, Disp: , Rfl:     triamcinolone acetonide 0.1% (KENALOG) 0.1 % cream, , Disp: , Rfl:     VIT BCOMP,C/FOLIC ACID/ZINC (B COMPLEX-C-FOLIC ACID-ZN ORAL), Take 2 tablets by mouth., Disp: , Rfl:     vitamin A 72593 UNIT capsule, Take 10,000 Units by mouth., Disp: , Rfl:     vitamin D 1000 units Tab, Take 1,000 Units by mouth., Disp: , Rfl:     vitamin E 400 UNIT capsule, Take 1 capsule every day by oral route., Disp: , Rfl:     vitamins  A,C,E-zinc-copper (PRESERVISION AREDS) 14,320-226-200 unit-mg-unit Cap, PreserVision AREDS  2 tabs qhs, Disp: , Rfl:     diclofenac sodium 1 % Gel, Apply 2 g topically once daily. 4 gm on affected knee tid prn, Disp: 1 Tube, Rfl: 1    PMHx/PSHx Updates:          Objective:     Vitals:  Blood pressure (!) 142/84, weight 79.2 kg (174 lb 8 oz).    Physical Examination:   GEN: no apparent distress, comfortable; AAOx3  SKIN: no rashes,no ulceration, no Raynaud's, no petechiae, no SQ nodules,  HEAD: normal  EYES: no pallor, no icterus  NECK: no masses, thyroid normal, trachea midline, no LAD/LN's, supple  CV: RRR with no murmur; l S1 and S2 reg. ,no gallop no rubs,   CHEST: Normal respiratory effort; CTAB; normal breath sounds; no wheeze or crackles  MUSC/Skeletal: ROM normal; no crepitus; joints without synovitis,  no deformities  No joint swelling or tenderness of PIP, MCP, wrist, elbow, shoulder, or knee joints  EXTREM: no clubbing, cyanosis, no edema,normal  pulses   NEURO: grossly intact; motor WNL; AAOx3  PSYCH: normal mood, affect and behavior  LYMPH: normal cervical, supraclavicular          Labs:   Lab Results   Component Value Date     WBC 6.3 07/05/2018    HGB 13.9 07/05/2018    HCT 41.8 07/05/2018    MCV 97.9 07/05/2018     07/05/2018    CMP  @LASTLAB(NA,K,CL,CO2,GLU,BUN,Creatinine,Calcium,PROT,Albumin,Bilitot,Alkphos,AST,ALT,CRP,ESR,RF,CCP,MERRICK,SSA,CPK,uric acid) )@  I have reviewed all available lab results and radiology reports.    Radiology/Diagnostic Studies:        Assessment/Plan:   (1) 78 y.o. female with diagnosis of osteoarthritis.  No recurrence of pain or swelling on the right knee after injection given on her last visit.  Synovial fluid analysis was negative  PLAN: Voltaren gel as needed on both knees.  Standard view of both knees.  Patient will be moving to [place] next week. She can return as needed.                Discussion:     I have explained all of the above in detail and the patient understands all of the current recommendation(s). I have answered all questions to the best of my ability and to their complete satisfaction.       The patient is to continue with the current management plan                Electronically signed by Mohinder Ordonez MD

## 2018-09-09 NOTE — PROGRESS NOTES
[facility] Urology Progress Note    Denisa Virgen is a 78 y.o. female who presents for follow-up of urge incontinence     She was initially seen by MELINDA Hunt on 11/17/17 for eval of recurrent UTIs (E Coli on 8/5/17) and recent gross hematuria. She had been given macrobid 14d course at that time after seeing red discoloration in urine. Udip in clinic had moderate blood. Ucx negative. She did also c/o urgency and uui 1-2 daily, and had been taking oxybutynin 5mg daily, and had constipation  Cytology negative. CT urogram was ordered and demonstrated left renal hemorrhagic cysts, confirmed on MRI.  No further hematuria since completing macrobid. No family history  malignancy. Quit smoking 40 years ago after 10 years of smoking. No chemical exposures     She did report urgency all the time for about 1 month prior to my evaluation, to the point she bought pads and was using about 9-10 per day. UUI with most voids.  ++ constipation - may be up to 2 weeks between BMs x3-4 mos. Occasional laxative. Had increased water intake. No bowel regimen.  1-2 cups coffee/am, 1 cup about 4pm, occ tea. Bad dry mouth. No dysuria. Questionably taking oxybutynin.  Discussed conservative measures to control urgency and frequency, and advised to limit bladder irritants and provided bowel regimen.  Discontinued oxybutynin and started Myrbetriq.  Given her gross hematuria and recurrent UTIs with new bothersome urgency and urge incontinence, performed cystoscopy 12/12/17.       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  No gross papillary tumors.  Biopsy taken which revealed cystitis cystica, and was negative for atypia or malignancy.  I prescribed a 90 day suppressive course of low-dose daily antibiotics, and elected to use Ceftin 250mg daily given potential  complications of chronic nitrofurantoin use in her age group.     1/29/18 - continued bothersome UUI. No longer having to wear pads every day, though still wetting herself on occasion. Urgency with 90% of her voids.  Rarely upon standing up has  urinary incontinence without warning. Usually with urge to go will leak on the way. Cut back on coffee, only 1.5 AM cups now. Improved bowels on miralax but still Q2-3d BM with push/strain  She has run out of her stool softener. She is taking her Myrbetriq. PVR 50cc  Ucx checked bc of her spont incontinence and was + for Ecoli R amp, I to aug/cefazolin, o/w panSens.  Treated with 1 week bactrim DS and then daily ppx switched to single strength septra x 2 mos    3/12/18 - Udip negative, still has urgency with all voids, DTF 6-7 times per day.  NTF 4 times. UUI often. Still taking Myrbetriq. 2 cups AM coffee. BM better with miralax and stool softener but still occ hard to push out. Mostly not wearing pads. PVR 15cc    Returns today noting she is doing well but is having some leakage with position changes sitting to standing and vice versa. Does have leakage with coughing > sneezing.   No further pad use at home. Does wear one for safety when out and about.  Slacked up on kegel exercises lately. Has also been working on boxing up house and moving yearsworth to an apartment.  Down to 1 cup of coffee daily, and rarely second cup.   Still poor water intake.  Denies UUI at this time - may happen 1 of 10 times. Urgency no loner with all voids.   BM better - is taking stool softener. Still has occasional pushing/straining and hard stool. Miralax daily was too much, and now taking it when feels like she is stopping up.  DTF 4x, NTF 1x.  Does not believe she is on myrbetriq and doesn't remember having taken it.  Udip negative.      ROS: A comprehensive 10 system review was performed and is negative except as noted above in HPI    PHYSICAL EXAM:    Vitals:    09/10/18 1353   BP: 138/86  "  Pulse: 106   Resp: 18   Temp: 98.6 °F (37 °C)     Body mass index is 26.55 kg/m². Weight: 79.2 kg (174 lb 9.7 oz) Height: 5' 8" (172.7 cm)       General: Alert, cooperative, no distress, appears stated age   Head: Normocephalic, without obvious abnormality, atraumatic   Eyes: PERRL, conjunctiva/corneas clear   Lungs: Respirations unlabored   Heart: Warm and well perfused   Abdomen: soft NT ND  Extremities: Extremities normal, atraumatic, no cyanosis or edema   Skin: Skin color, texture, turgor normal, no rashes or lesions   Psych: Appropriate   Neurologic: Non-focal       Recent Results (from the past 336 hour(s))   POCT URINE DIPSTICK WITHOUT MICROSCOPE    Collection Time: 09/10/18  2:02 PM   Result Value Ref Range    Color, UA yellow,clear     Spec Grav UA 1.015     pH, UA 5.0     WBC, UA neg     Nitrite, UA neg     Protein neg     Glucose, UA neg     Ketones, UA neg     Urobilinogen, UA neg     Bilirubin neg     Blood, UA neg        ASSESSMENT   1. Mixed stress and urge urinary incontinence  POCT URINE DIPSTICK WITHOUT MICROSCOPE       Plan    The doing well without any recurrence of infection, and with less urgency frequency.  She is not currently on any away be meds, and actually does not recall taking them, though her frequency and urgency of largely improved and she is not having urgency with all of her voids and has a very rare urge incontinent episode.  She has made a good effort to reduce bladder irritants and is down to only 1 cup of coffee a day.  No need to resume meds at this time  At this time, her complaint is largely of stress urinary incontinence and leakage with position changes, coughing sneezing.  We did review Kegel exercises and pelvic floor strengthening exercises which she has not been doing, and will employ at this time.    She has made a tentative 3-4-month follow-up appointment with UroGyn for further evaluation of stress incontinence in the setting of mixed incontinence, should it " persist.  We did again review elements of an over-the-counter bowel regimen, and she still does have some hard stool she has to push or strain for, and we again reviewed the implications of constipation on urinary tract function.

## 2018-09-10 ENCOUNTER — OFFICE VISIT (OUTPATIENT)
Dept: UROLOGY | Facility: CLINIC | Age: 78
End: 2018-09-10
Payer: MEDICARE

## 2018-09-10 VITALS
BODY MASS INDEX: 26.47 KG/M2 | TEMPERATURE: 99 F | HEART RATE: 106 BPM | WEIGHT: 174.63 LBS | HEIGHT: 68 IN | RESPIRATION RATE: 18 BRPM | DIASTOLIC BLOOD PRESSURE: 86 MMHG | SYSTOLIC BLOOD PRESSURE: 138 MMHG

## 2018-09-10 DIAGNOSIS — N39.46 MIXED STRESS AND URGE URINARY INCONTINENCE: Primary | ICD-10-CM

## 2018-09-10 LAB
BILIRUB SERPL-MCNC: NORMAL MG/DL
BLOOD URINE, POC: NORMAL
COLOR, POC UA: NORMAL
GLUCOSE UR QL STRIP: NORMAL
KETONES UR QL STRIP: NORMAL
LEUKOCYTE ESTERASE URINE, POC: NORMAL
NITRITE, POC UA: NORMAL
PH, POC UA: 5
PROTEIN, POC: NORMAL
SPECIFIC GRAVITY, POC UA: 1.01
UROBILINOGEN, POC UA: NORMAL

## 2018-09-10 PROCEDURE — 99213 OFFICE O/P EST LOW 20 MIN: CPT | Mod: S$PBB,,, | Performed by: UROLOGY

## 2018-09-10 PROCEDURE — 99999 PR PBB SHADOW E&M-EST. PATIENT-LVL III: CPT | Mod: PBBFAC,,, | Performed by: UROLOGY

## 2018-09-10 PROCEDURE — 81002 URINALYSIS NONAUTO W/O SCOPE: CPT | Mod: PBBFAC,PN | Performed by: UROLOGY

## 2018-09-10 PROCEDURE — 99213 OFFICE O/P EST LOW 20 MIN: CPT | Mod: PBBFAC,PN | Performed by: UROLOGY

## 2018-09-10 NOTE — PATIENT INSTRUCTIONS
Bowel regimen:   - any or all of the following in any combination, titrate to soft daily bowel movement without pushing or straining    - colace/stool softener capsule - once to twice daily  - miralax - 1 capful daily on average (can increase to 2x/day or decrease to 1/2 cap daily, or use every other day if needed)  - increase dietary fibery  - fiber supplements, such as metamucil (1 spoonful daily)

## 2019-01-02 ENCOUNTER — OFFICE VISIT (OUTPATIENT)
Dept: RHEUMATOLOGY | Facility: CLINIC | Age: 79
End: 2019-01-02
Payer: MEDICARE

## 2019-01-02 VITALS
WEIGHT: 176.31 LBS | SYSTOLIC BLOOD PRESSURE: 127 MMHG | BODY MASS INDEX: 26.81 KG/M2 | DIASTOLIC BLOOD PRESSURE: 85 MMHG

## 2019-01-02 DIAGNOSIS — M15.9 PRIMARY OSTEOARTHRITIS INVOLVING MULTIPLE JOINTS: Primary | ICD-10-CM

## 2019-01-02 LAB
ALBUMIN SERPL-MCNC: 3.8 G/DL (ref 3.1–4.7)
ALP SERPL-CCNC: 73 IU/L (ref 40–104)
ALT (SGPT): 19 IU/L (ref 3–33)
AST SERPL-CCNC: 22 IU/L (ref 10–40)
BASOPHILS NFR BLD: 0 K/UL (ref 0–0.2)
BASOPHILS NFR BLD: 1.1 %
BILIRUB SERPL-MCNC: 0.6 MG/DL (ref 0.3–1)
BUN SERPL-MCNC: 11 MG/DL (ref 8–20)
CALCIUM SERPL-MCNC: 9.6 MG/DL (ref 7.7–10.4)
CHLORIDE: 104 MMOL/L (ref 98–110)
CO2 SERPL-SCNC: 28.5 MMOL/L (ref 22.8–31.6)
CREATININE: 0.75 MG/DL (ref 0.6–1.4)
EOSINOPHIL NFR BLD: 0.1 K/UL (ref 0–0.7)
EOSINOPHIL NFR BLD: 1.8 %
ERYTHROCYTE [DISTWIDTH] IN BLOOD BY AUTOMATED COUNT: 13 % (ref 11.7–14.9)
GLUCOSE: 98 MG/DL (ref 70–99)
GRAN #: 1.8 K/UL (ref 1.4–6.5)
GRAN%: 65.6 %
HCT VFR BLD AUTO: 43.4 % (ref 36–48)
HGB BLD-MCNC: 13.7 G/DL (ref 12–15)
IMMATURE GRANS (ABS): 0 K/UL (ref 0–1)
IMMATURE GRANULOCYTES: 0.4 %
LYMPH #: 0.6 K/UL (ref 1.2–3.4)
LYMPH%: 21.1 %
MCH RBC QN AUTO: 31.3 PG (ref 25–35)
MCHC RBC AUTO-ENTMCNC: 31.6 G/DL (ref 31–36)
MCV RBC AUTO: 99.1 FL (ref 79–98)
MONO #: 0.3 K/UL (ref 0.1–0.6)
MONO%: 10 %
NUCLEATED RBCS: 0 %
PLATELET # BLD AUTO: 222 K/UL (ref 140–440)
PMV BLD AUTO: 11.7 FL (ref 8.8–12.7)
POTASSIUM SERPL-SCNC: 4.4 MMOL/L (ref 3.5–5)
PROT SERPL-MCNC: 6.7 G/DL (ref 6–8.2)
RBC # BLD AUTO: 4.38 M/UL (ref 3.5–5.5)
SODIUM: 141 MMOL/L (ref 134–144)
WBC # BLD AUTO: 2.8 K/UL (ref 5–10)

## 2019-01-02 PROCEDURE — 99213 PR OFFICE/OUTPT VISIT, EST, LEVL III, 20-29 MIN: ICD-10-PCS | Mod: ,,, | Performed by: INTERNAL MEDICINE

## 2019-01-02 PROCEDURE — 99213 OFFICE O/P EST LOW 20 MIN: CPT | Mod: ,,, | Performed by: INTERNAL MEDICINE

## 2019-01-02 RX ORDER — DICLOFENAC SODIUM 10 MG/G
2 GEL TOPICAL DAILY
Qty: 1 TUBE | Refills: 1 | Status: SHIPPED | OUTPATIENT
Start: 2019-01-02 | End: 2019-03-07 | Stop reason: SDUPTHER

## 2019-01-02 RX ORDER — LORATADINE 10 MG/1
10 TABLET ORAL DAILY
COMMUNITY
End: 2020-03-03

## 2019-01-02 NOTE — PROGRESS NOTES
[facility] RHEUMATOLOGY            PROGRESS NOTE      Subjective:       Patient ID:   NAME: Denisa Virgen : 1940     78 y.o. female    Referring Doc: No ref. provider found  Other Physicians:    Chief Complaint:  Osteoarthritis (Bilateral Thumb swelling off and on. Needs refill on Diclofenac gel sent to the [facility] in [place])      History of Present Illness:     Patient returns today for a regularly scheduled follow-up visit for  Osteoarthritis     The patient experiencing pain in first carpometacarpal joints andIP joints of the thumbs. No chest pains. No paresthesias. No GI complaints.            ROS:   GEN:  No  fever, night sweats . weight is stable   + fatigue  SKIN: no rashes, no bruising, no ulcerations, no Raynaud's  HEENT: no HA's, No visual changes, no mucosal ulcers, no sicca symptoms,  CV:   no CP, SOB, PND, YOON, no orthopnea, no palpitations  PULM: normal with no SOB, + occ cough, No hemoptysis, sputum or pleuritic pain  GI:  no abdominal pain, nausea, vomiting, constipation, diarrhea, melanotic stools, BRBPR, hematemesis, no dysphagia  :   no dysuria  NEURO: no paresthesias, headaches, visual disturbances, muscle weakness  MUSCULOSKELETAL+ joint swelling, prolonged AM stiffness, no back pain, no muscle pain  Allergies:  Review of patient's allergies indicates:   Allergen Reactions    Levofloxacin Rash       Medications:    Current Outpatient Medications:     diclofenac sodium (VOLTAREN) 1 % Gel, Apply 2 g topically once daily. 4 gm on affected knee tid prn, Disp: 1 Tube, Rfl: 1    fluticasone (FLONASE) 50 mcg/actuation nasal spray, , Disp: , Rfl:     fluticasone-vilanterol (BREO) 100-25 mcg/dose diskus inhaler, 1 puff once a day, Disp: , Rfl:     krill/om-3/dha/epa/phospho/ast (MEGARED OMEGA-3 KRILL OIL ORAL), Take by mouth., Disp: , Rfl:     loratadine (CLARITIN) 10 mg tablet, Take 10 mg by mouth once daily., Disp: , Rfl:     montelukast (SINGULAIR) 10 mg tablet, TAKE ONE TABLET  BY MOUTH ONCE DAILY, Disp: , Rfl:     nortriptyline (PAMELOR) 50 MG capsule, Take 2 capsules as needed by oral route at bedtime as needed., Disp: , Rfl:     tiZANidine 4 mg Cap, TAKE TWO TABLETS BY MOUTH EVERY 8 HOURS AS NEEDED, Disp: , Rfl:     PMHx/PSHx Updates:          Objective:     Vitals:  Blood pressure 127/85, weight 80 kg (176 lb 4.8 oz).    Physical Examination:   GEN: no apparent distress, comfortable; AAOx3  SKIN: no rashes,no ulceration, no Raynaud's, no petechiae, no SQ nodules,  HEAD: normal  EYES: no pallor, no icterus  NECK: no masses, thyroid normal, trachea midline, no LAD/LN's, supple  CV: RRR with no murmur; l S1 and S2 reg. ,no gallop no rubs,   CHEST: Normal respiratory effort; CTAB; normal breath sounds; no wheeze or crackles  MUSC/Skeletal: ROM normal; no crepitus; joints without synovitis,  no deformities  No joint swelling or tenderness of PIP, MCP, wrist, elbow, shoulder, or knee joints.tenderness first carpometacarpal joints,IP joints of both thumbs  EXTREM: no clubbing, cyanosis, no edema,normal  pulses   NEURO: grossly intact; motor WNL; AAOx3; ,   PSYCH: normal mood, affect and behavior  LYMPH: normal cervical, supraclavicular          Labs:   Lab Results   Component Value Date    WBC 6.3 07/05/2018    HGB 13.9 07/05/2018    HCT 41.8 07/05/2018    MCV 97.9 07/05/2018     07/05/2018    CMP  @[facility](NA,K,CL,CO2,GLU,BUN,Creatinine,Calcium,PROT,Albumin,Bilitot,Alkphos,AST,ALT,CRP,ESR,RF,CCP,MERRICK,SSA,CPK,uric acid) )@  I have reviewed all available lab results and radiology reports.    Radiology/Diagnostic Studies:        Assessment/Plan:   (1) 78 y.o. female with diagnosis of Osteoarthritis.  Continue with diclofenac cream as needed and Tylenol      CBC CMP        Discussion:     I have explained all of the above in detail and the patient understands all of the current recommendation(s). I have answered all questions to the best of my ability and to their complete satisfaction.        The patient is to continue with the current management plan         RTC in  4 months      Electronically signed by Mohinder Ordonez MD

## 2019-01-03 NOTE — PROGRESS NOTES
Spoke to pt's son Juan Manuel regarding low WBC and need to repeat level, if still low needs to see a hematologist. Juan Manuel states he is an ICU nurse at [employer]. He will have the CBC done there with an order from the pulmonologist his Mom has seen there. And will make any f/u appts as needed.

## 2019-01-07 DIAGNOSIS — D72.819 LEUKOPENIA, UNSPECIFIED TYPE: Primary | ICD-10-CM

## 2019-01-07 DIAGNOSIS — Z79.899 ENCOUNTER FOR LONG-TERM (CURRENT) USE OF MEDICATIONS: ICD-10-CM

## 2019-01-11 ENCOUNTER — OFFICE VISIT (OUTPATIENT)
Dept: UROGYNECOLOGY | Facility: CLINIC | Age: 79
End: 2019-01-11
Payer: MEDICARE

## 2019-01-11 ENCOUNTER — TELEPHONE (OUTPATIENT)
Dept: UROGYNECOLOGY | Facility: CLINIC | Age: 79
End: 2019-01-11

## 2019-01-11 ENCOUNTER — HOSPITAL ENCOUNTER (OUTPATIENT)
Dept: RADIOLOGY | Facility: HOSPITAL | Age: 79
Discharge: HOME | End: 2019-01-11
Attending: OBSTETRICS & GYNECOLOGY
Payer: MEDICARE

## 2019-01-11 VITALS
HEIGHT: 68 IN | DIASTOLIC BLOOD PRESSURE: 80 MMHG | WEIGHT: 173.31 LBS | BODY MASS INDEX: 26.27 KG/M2 | HEART RATE: 90 BPM | SYSTOLIC BLOOD PRESSURE: 133 MMHG

## 2019-01-11 DIAGNOSIS — R35.1 NOCTURIA: Primary | ICD-10-CM

## 2019-01-11 DIAGNOSIS — R35.0 URINARY FREQUENCY: ICD-10-CM

## 2019-01-11 DIAGNOSIS — R39.15 URINARY URGENCY: ICD-10-CM

## 2019-01-11 DIAGNOSIS — N89.8 VAGINAL DISCHARGE: ICD-10-CM

## 2019-01-11 DIAGNOSIS — R10.32 LLQ PAIN: ICD-10-CM

## 2019-01-11 LAB
BILIRUB SERPL-MCNC: ABNORMAL MG/DL
BLOOD URINE, POC: ABNORMAL
COLOR, POC UA: YELLOW
GLUCOSE UR QL STRIP: ABNORMAL
KETONES UR QL STRIP: ABNORMAL
LEUKOCYTE ESTERASE URINE, POC: ABNORMAL
NITRITE, POC UA: ABNORMAL
PH, POC UA: 6
PROTEIN, POC: ABNORMAL
SPECIFIC GRAVITY, POC UA: 1.02
UROBILINOGEN, POC UA: 0.2

## 2019-01-11 PROCEDURE — 99205 PR OFFICE/OUTPT VISIT, NEW, LEVL V, 60-74 MIN: ICD-10-PCS | Mod: S$PBB,,, | Performed by: OBSTETRICS & GYNECOLOGY

## 2019-01-11 PROCEDURE — 74177 CT ABD & PELVIS W/CONTRAST: CPT | Mod: TC

## 2019-01-11 PROCEDURE — 87077 CULTURE AEROBIC IDENTIFY: CPT

## 2019-01-11 PROCEDURE — 74177 CT ABDOMEN PELVIS WITH CONTRAST: ICD-10-PCS | Mod: 26,,, | Performed by: RADIOLOGY

## 2019-01-11 PROCEDURE — 99999 PR PBB SHADOW E&M-EST. PATIENT-LVL IV: ICD-10-PCS | Mod: PBBFAC,,, | Performed by: OBSTETRICS & GYNECOLOGY

## 2019-01-11 PROCEDURE — 87086 URINE CULTURE/COLONY COUNT: CPT

## 2019-01-11 PROCEDURE — 87186 SC STD MICRODIL/AGAR DIL: CPT

## 2019-01-11 PROCEDURE — 87088 URINE BACTERIA CULTURE: CPT

## 2019-01-11 PROCEDURE — 81002 URINALYSIS NONAUTO W/O SCOPE: CPT | Mod: PBBFAC,PO | Performed by: OBSTETRICS & GYNECOLOGY

## 2019-01-11 PROCEDURE — 99214 OFFICE O/P EST MOD 30 MIN: CPT | Mod: PBBFAC,25,PO | Performed by: OBSTETRICS & GYNECOLOGY

## 2019-01-11 PROCEDURE — 74177 CT ABD & PELVIS W/CONTRAST: CPT | Mod: 26,,, | Performed by: RADIOLOGY

## 2019-01-11 PROCEDURE — 99999 PR PBB SHADOW E&M-EST. PATIENT-LVL IV: CPT | Mod: PBBFAC,,, | Performed by: OBSTETRICS & GYNECOLOGY

## 2019-01-11 PROCEDURE — 99205 OFFICE O/P NEW HI 60 MIN: CPT | Mod: S$PBB,,, | Performed by: OBSTETRICS & GYNECOLOGY

## 2019-01-11 PROCEDURE — 25500020 PHARM REV CODE 255

## 2019-01-11 RX ORDER — TIZANIDINE 4 MG/1
TABLET ORAL
COMMUNITY
Start: 2019-01-03

## 2019-01-11 RX ORDER — FLUCONAZOLE 150 MG/1
TABLET ORAL
COMMUNITY
Start: 2019-01-03 | End: 2019-05-01

## 2019-01-11 RX ORDER — ESTRADIOL 0.1 MG/G
0.5 CREAM VAGINAL
Qty: 45 G | Refills: 4 | Status: SHIPPED | OUTPATIENT
Start: 2019-01-14 | End: 2019-04-16

## 2019-01-11 RX ADMIN — IOHEXOL 75 ML: 350 INJECTION, SOLUTION INTRAVENOUS at 05:01

## 2019-01-11 RX ADMIN — IOHEXOL 30 ML: 350 INJECTION, SOLUTION INTRAVENOUS at 05:01

## 2019-01-11 NOTE — TELEPHONE ENCOUNTER
----- Message from Evelina Finch sent at 1/11/2019  2:35 PM CST -----  Contact: Rishi  Type:  Pharmacy Calling to Clarify an RX    Name of Caller:  Rishi  Pharmacy Name:  [facility]  Prescription Name:  estradiol (ESTRACE) 0.01 % (0.1 mg/gram) vaginal cream  What do they need to clarify?: directions  Best Call Back Number:  [phone]  Additional Information:  Requesting a call back

## 2019-01-11 NOTE — PATIENT INSTRUCTIONS
1.  Mixed urinary incontinence, urge > > stress:   --Bladder diary for 3-7 days, write the number of times you go to the rest room and associated symptoms of urgency or leakage.   --Empty bladder every 3 hours.  Empty well: wait a minute, lean forward on toilet.    --Avoid dietary irritants (see sheet).  Keep diary x 3-5 days to determine your irritants.  --KEGELS: do 10 in AM and 10 in PM, holding each x 10 seconds.  When you feel urge to go, STOP, KEGEL, and when urge has passed, then go to bathroom.  --URGE: Start Myrbetriq 50 mg daily.  SE profile reviewed.   Takes 2-4 weeks to see if will have effect.  For dry mouth: get sour, sugar free lozenge or gum.    --STRESS:  Pessary vs. Sling.     2. Vaginal discharge: r/o colovaginal fistula   ? Brownish vaginal discharge  CT abd/pel w/wo contrast     3. Constipation:  Controlling constipation may help bladder urgency/leakage and fiber may better control cholesterol and blood glucose.  Start daily fiber.  Take 1 tsp of fiber powder (psyllium or other sugar-free powder).  Mix in 8 oz of water.  Take x 3-5 days.  Then, increase fiber by 1 tsp every 3-5 days until stool is easy to pass.  Stop and continue at that dose.   Do not exceed 6 tsps/day. Start Miralax

## 2019-01-11 NOTE — PROGRESS NOTES
Subjective:       Patient ID: Denisa Virgen is a 78 y.o. female.    Chief Complaint:  bladder incontience      History of Present Illness  HPI 78 Y O F with  has a past medical history of Asthma, Chronic low back pain, COPD (chronic obstructive pulmonary disease), GERD (gastroesophageal reflux disease), and High cholesterol. referred by Dr. Cole for evaluation of urinary incontinence. Patient is most bothered by night time urination.     [facility]     Question 1/9/2019  9:40 AM CST - Filed by Ana Dong MA on 1/9/2019   General Urogynecology: Are you experiencing the following?    Dysuria (painful urination) Yes   Nocturia:  waking up at night to empty your bladder  Yes   If you answered yes to the previous question, how many times does this happen per night? 3-4   Enuresis (urine loss during sleep) No   Dribbling urine after you urinate No   Hematuria (urine appears red) No   Type of stream Strong   Urinary frequency: How often a day are you going to the bathroom per day?  10-15   Urinary Tract Infections: How many Urinary Tract Infections have you had in the past year? I have not had a UTI in the past year   If you have had a UTI in the past year, what treatments have you had so far?  I have not had a UTI in the past year   Urinary Incontinence (General): Are you experiencing the following?    Past consultation for incontinence: Have you ever seen someone for the evaluation of incontinence? Yes   If you answered yes to the previous question, please select all the therapies you have tried.  Kegals   Please note the effectiveness of the therapies. Ineffective   Need to wear protection to keep clothes dry  Yes   If you answered yes to the previous question, please lianne the protection you use.  Pads   If you wear protection, how much wetness is typically on each pad? Soaked   If you wear protection, how often do you have to change per day, if applicable?  2   Stress Symptoms: Are you experiencing the  "following?    Leakage of urine with cough, laugh and/or sneeze Yes   If you answered yes to the previous question, what is the frequency in days, weeks and/or months? Daily   Leakage of urine with sex No   Leakage of urine with bending/ lifting No   Leakage of urine with briskly walking or jogging No   If you lose urine for any other reason not previously mentioned, please note it below, if applicable.     Urge Symptoms: Are you experiencing the following?    Urgency ("got to go" feeling) Yes   Urge: How frequently do you feel an urge to urinate (feeling like you "gotta go" to the bathroom and can't wait) Daily   Do you experience a leakage of urine when you have a feeling of urgency?  Yes   Leakage of urine when unaware No   Past use of anticholinergics (medications used to treat overactive bladder) No   If you answered yes to the previous question, please lianne the anticholinergics you have used:     Have you ever used Mirbetriq (aka Mirabegron)?  No   Prolapse Symptoms: Are you experiencing any of the following?     Falling out/ Bulging/ Heaviness in the vagina No   Vaginal/ Abdominal Pain/ Pressure Yes   Need to strain/ Push to void No   Need to wait on the toilet before you void No   Unusual position to urinate (using your hands to push back the vaginal bulge) No   Sensation of incomplete emptying No   Past use of pessary device No   If you answered yes to the previous question, please list the devices you have used below.     Bowel Symptoms: Are you experiencing any of the following?    Constipation Yes   Diarrhea  No   Hematochezia (bloody stool) No   Incomplete evacuation of stool Yes   Involuntary loss of formed stool No   Fecal smearing/urgency No   Involuntary loss of gas Yes   Vaginal Symptoms: Are you experiencing any of the following?     Abnormal vaginal bleeding  No   Vaginal dryness No   Sexually active  No   Dyspareunia (painful intercourse) No   Estrogen use  No       GYN & OB History  No LMP " recorded (lmp unknown). Patient has had a hysterectomy.   Date of Last Pap: No result found    OB History   No data available     OB  Largest:  8#  Forceps: Yes  Episiotomy:  Yes    GYN  Menarche: [age]  Menstrual cycle: n/a  Menopause: 60's  Hysterectomy: TVH  Ovaries: present     Past Medical History:   Diagnosis Date    Asthma     Chronic low back pain     COPD (chronic obstructive pulmonary disease)     GERD (gastroesophageal reflux disease)     High cholesterol      Past Surgical History:   Procedure Laterality Date    CYSTOSCOPY N/A 12/12/2017    Performed by [facility]za [facility] at [facility] OR    HYSTERECTOMY      TONSILLECTOMY      veins in left leg       Review of patient's allergies indicates:   Allergen Reactions    Levofloxacin Rash       Current Outpatient Medications:     diclofenac sodium (VOLTAREN) 1 % Gel, Apply 2 g topically once daily. 4 gm on affected knee tid prn, Disp: 1 Tube, Rfl: 1    fluticasone (FLONASE) 50 mcg/actuation nasal spray, , Disp: , Rfl:     fluticasone-vilanterol (BREO) 100-25 mcg/dose diskus inhaler, 1 puff once a day, Disp: , Rfl:     krill/om-3/dha/epa/phospho/ast (MEGARED OMEGA-3 KRILL OIL ORAL), Take by mouth., Disp: , Rfl:     loratadine (CLARITIN) 10 mg tablet, Take 10 mg by mouth once daily., Disp: , Rfl:     montelukast (SINGULAIR) 10 mg tablet, TAKE ONE TABLET BY MOUTH ONCE DAILY, Disp: , Rfl:     nortriptyline (PAMELOR) 50 MG capsule, Take 2 capsules as needed by oral route at bedtime as needed., Disp: , Rfl:     tiZANidine 4 mg Cap, TAKE TWO TABLETS BY MOUTH EVERY 8 HOURS AS NEEDED, Disp: , Rfl:     estradiol (ESTRACE) 0.01 % (0.1 mg/gram) vaginal cream, Place 0.5 g vaginally twice a week. Apply to the vagina daily for two weeks then twice a week., Disp: 45 g, Rfl: 4    fluconazole (DIFLUCAN) 150 MG Tab, , Disp: , Rfl:     mirabegron (MYRBETRIQ) 25 mg Tb24 ER tablet, Take 1 tablet (25 mg total) by mouth once daily., Disp: 30 tablet, Rfl: 11     sulfamethoxazole-trimethoprim 800-160mg (BACTRIM DS) 800-160 mg Tab, Take 1 tablet by mouth 2 (two) times daily., Disp: 10 tablet, Rfl: 0    tiZANidine (ZANAFLEX) 4 MG tablet, , Disp: , Rfl:     Review of Systems  Review of Systems   Constitutional: Negative.  Negative for activity change, appetite change, chills, diaphoresis, fatigue, fever and unexpected weight change.   HENT: Negative.    Eyes: Negative.    Respiratory: Negative.  Negative for apnea, cough and wheezing.    Cardiovascular: Negative.  Negative for chest pain and palpitations.   Gastrointestinal: Positive for constipation. Negative for abdominal distention, abdominal pain, anal bleeding, blood in stool, diarrhea, nausea, rectal pain and vomiting.   Endocrine: Negative.    Genitourinary: Positive for frequency and urgency. Negative for decreased urine volume, difficulty urinating, dyspareunia, dysuria, enuresis, flank pain, genital sores, hematuria, menstrual problem, pelvic pain, vaginal bleeding, vaginal discharge and vaginal pain.   Musculoskeletal: Negative for back pain and gait problem.   Skin: Negative for color change, pallor, rash and wound.   Allergic/Immunologic: Negative for immunocompromised state.   Neurological: Negative.  Negative for dizziness and speech difficulty.   Hematological: Negative for adenopathy.   Psychiatric/Behavioral: Negative for agitation, behavioral problems, confusion and sleep disturbance.           Objective:     Physical Exam   Constitutional: She is oriented to person, place, and time. She appears well-developed.   HENT:   Head: Normocephalic and atraumatic.   Eyes: Conjunctivae and EOM are normal.   Neck: Normal range of motion. Neck supple.   Cardiovascular: Normal rate, regular rhythm, S1 normal, S2 normal, normal heart sounds and intact distal pulses.   Pulmonary/Chest: Effort normal and breath sounds normal. She exhibits no tenderness.   Abdominal: Soft. Bowel sounds are normal. She exhibits no distension  and no mass. There is no hepatosplenomegaly, splenomegaly or hepatomegaly. There is no tenderness. There is no rigidity, no rebound, no guarding and no CVA tenderness. Hernia confirmed negative in the right inguinal area and confirmed negative in the left inguinal area.   Genitourinary: Pelvic exam was performed with patient supine. Rectum normal, skenes normal and bartholins normal. Right labia normal and left labia normal. Urethra exhibits hypermobility. Urethra exhibits no urethral caruncle, no urethral diverticulum and no urethral mass. Right bartholin is not enlarged and not tender. Left bartholin is not enlarged and not tender. Rectal exam shows resting tone normal and active tone normal. Rectal exam shows no external hemorrhoid, no fissure, no tenderness, anal tone normal and no dovetailing. Guaiac negative stool. There is erythema and atrophy in the vagina. No foreign body, tenderness, bleeding, unspecified prolapse of vaginal walls, fistula, mesh exposure or lavator tenderness in the vagina. No vaginal discharge found. Right adnexum displays no tenderness. Left adnexum displays no tenderness. Cervix exhibits absence. Uterus is absent.   PVR: 50 ML  Empty cough stress test: Negative.  Kegel: 3/5    POP-Q  Aa: -2 Ba: -2 C: -5   GH: 3 PB: 2 TVL: 8   Ap: -2 Bp: -2 D:                      Genitourinary Comments: ? Dimpling at the level of the cuff with? Brownish discharge noted with valsalva.    Musculoskeletal: Normal range of motion.   Lymphadenopathy:     She has no axillary adenopathy.        Right: No inguinal adenopathy present.        Left: No inguinal adenopathy present.   Neurological: She is alert and oriented to person, place, and time. She has normal strength and normal reflexes. Cranial nerves II through XII intact. No cranial nerve deficit.   Skin: Skin is warm, dry and intact.   Psychiatric: She has a normal mood and affect. Her speech is normal and behavior is normal. Judgment normal. Cognition  and memory are normal.         HCM  Pap's: no recent, no history of abnormal  Mammo: normal, 2018-   Colonoscopy: 2010- normal   Dexa:?          Assessment:        1. Nocturia    2. Urinary frequency    3. LLQ pain    4. Vaginal discharge    5. Urinary urgency             Plan:      1.  Mixed urinary incontinence, urge > > stress:   --Bladder diary for 3-7 days, write the number of times you go to the rest room and associated symptoms of urgency or leakage.   --Empty bladder every 3 hours.  Empty well: wait a minute, lean forward on toilet.    --Avoid dietary irritants (see sheet).  Keep diary x 3-5 days to determine your irritants.  --KEGELS: do 10 in AM and 10 in PM, holding each x 10 seconds.  When you feel urge to go, STOP, KEGEL, and when urge has passed, then go to bathroom.  --URGE: Start Myrbetriq 50 mg daily.  SE profile reviewed.   Takes 2-4 weeks to see if will have effect.  For dry mouth: get sour, sugar free lozenge or gum.    --STRESS:  Pessary vs. Sling.     2. Vaginal discharge: r/o colovaginal fistula    ? Brownish vaginal discharge  CT abd/pel w/wo contrast     3. Constipation:  Controlling constipation may help bladder urgency/leakage and fiber may better control cholesterol and blood glucose.  Start daily fiber.  Take 1 tsp of fiber powder (psyllium or other sugar-free powder).  Mix in 8 oz of water.  Take x 3-5 days.  Then, increase fiber by 1 tsp every 3-5 days until stool is easy to pass.  Stop and continue at that dose.   Do not exceed 6 tsps/day. Start Miralax    4. Vaginal atrophy (dryness):  Use 1 gram of estrogen cream in vagina nightly x 2 weeks, then twice a week thereafter.      5. --Nocturia (nighttime urination): stop fluids 2 hours before bed.  If have leg swelling:  Elevate feet above chest x 1 hour before bed to get excess fluid off.  Can also use support hose (knee highs).         Approximately  45 min were spent in consult, 90 % in discussion.     Thank you for requesting  consultation of your patient.  I look forward to participating in her care.    Gerald Parrish DO  Female Pelvic Medicine and Reconstructive Surgery  [facility] [place], [place]

## 2019-01-11 NOTE — LETTER
January 28, 2019      Reza Cole MD  [address] [place] [address]  [address]  [facility] [address]           [facility]rogynecology  [address] [place] [address] Drive, [facility] [address]  [facility] [address]  Phone: [phone]  Fax: [phone]          Patient: Denisa Virgen   MR Number: [id number]   YOB: 1940   Date of Visit: 1/11/2019       Dear Dr. Reza Cole:    Thank you for referring Denisa Virgen to me for evaluation. Attached you will find relevant portions of my assessment and plan of care.    If you have questions, please do not hesitate to call me. I look forward to following Denisa Virgen along with you.    Sincerely,    Gerald Parrish, DO [facility]  CC:  No Recipients    If you would like to receive this communication electronically, please contact [email or web address] or [phone] to request more information on [facility] [employer] access.    For providers and/or their staff who would like to refer a patient to [facility], please contact us through our one-stop-shop provider referral line, [facility] , at [phone].    If you feel you have received this communication in error or would no longer like to receive these types of communications, please e-mail [email or web address]

## 2019-01-11 NOTE — TELEPHONE ENCOUNTER
Called twice could not reach the pharmacy was hung up on and called back and number rang.   Patient AOx3, can be confused at times. Patient has good safety awareness and uses the call light when assistance is needed. Patient denies complaints of pain this am and is aware that she is going to a skilled facility today. Patient states she feels anxious about the change because she has had a good experience at rehab. Will continue to monitor and assess throughout the day.

## 2019-01-13 LAB — BACTERIA UR CULT: NORMAL

## 2019-01-18 ENCOUNTER — TELEPHONE (OUTPATIENT)
Dept: UROGYNECOLOGY | Facility: CLINIC | Age: 79
End: 2019-01-18

## 2019-01-18 RX ORDER — SULFAMETHOXAZOLE AND TRIMETHOPRIM 800; 160 MG/1; MG/1
1 TABLET ORAL 2 TIMES DAILY
Qty: 10 TABLET | Refills: 0 | Status: SHIPPED | OUTPATIENT
Start: 2019-01-18 | End: 2019-02-18 | Stop reason: SDUPTHER

## 2019-01-21 ENCOUNTER — TELEPHONE (OUTPATIENT)
Dept: UROGYNECOLOGY | Facility: CLINIC | Age: 79
End: 2019-01-21

## 2019-01-21 NOTE — TELEPHONE ENCOUNTER
Spoke with pt ans confirmed that her culture was positive and prescription was sent to her pharmacy, pt voiced understanding and call was ended.

## 2019-01-21 NOTE — TELEPHONE ENCOUNTER
Spoke to pt, she was informed of the results of her urine culture, pt aware and verbalizes understanding

## 2019-01-21 NOTE — TELEPHONE ENCOUNTER
----- Message from Gerald Parrish DO sent at 1/18/2019  6:34 PM CST -----  Please call the patient to let her know that the urine results are normal. Thank you

## 2019-01-28 PROBLEM — R35.1 NOCTURIA: Status: ACTIVE | Noted: 2019-01-28

## 2019-01-28 PROBLEM — R39.15 URINARY URGENCY: Status: ACTIVE | Noted: 2019-01-28

## 2019-02-15 ENCOUNTER — TELEPHONE (OUTPATIENT)
Dept: UROGYNECOLOGY | Facility: CLINIC | Age: 79
End: 2019-02-15

## 2019-02-15 DIAGNOSIS — N28.89 RENAL MASS: Primary | ICD-10-CM

## 2019-02-15 NOTE — TELEPHONE ENCOUNTER
CT scan with right upper pole mass recommend sonogram. Renal sonogram ordered.   Called patient unable to leave a message.

## 2019-02-18 NOTE — TELEPHONE ENCOUNTER
Attempted to contact pt to schedule renal sonogram no answer left message for her to contact the office.

## 2019-02-19 RX ORDER — SULFAMETHOXAZOLE AND TRIMETHOPRIM 800; 160 MG/1; MG/1
TABLET ORAL
Qty: 10 TABLET | Refills: 0 | Status: SHIPPED | OUTPATIENT
Start: 2019-02-19 | End: 2019-05-01

## 2019-02-19 NOTE — TELEPHONE ENCOUNTER
Unclear why rx resent to me for 90 day supply. Called patient unable to leafve a message. New rx sent for 5 day tx.

## 2019-03-07 ENCOUNTER — OFFICE VISIT (OUTPATIENT)
Dept: UROGYNECOLOGY | Facility: CLINIC | Age: 79
End: 2019-03-07
Payer: MEDICARE

## 2019-03-07 VITALS
HEIGHT: 68 IN | SYSTOLIC BLOOD PRESSURE: 130 MMHG | DIASTOLIC BLOOD PRESSURE: 74 MMHG | BODY MASS INDEX: 26.49 KG/M2 | WEIGHT: 174.81 LBS | HEART RATE: 98 BPM

## 2019-03-07 DIAGNOSIS — R35.0 URINARY FREQUENCY: Primary | ICD-10-CM

## 2019-03-07 DIAGNOSIS — R35.1 NOCTURIA: ICD-10-CM

## 2019-03-07 DIAGNOSIS — N89.8 VAGINAL DISCHARGE: ICD-10-CM

## 2019-03-07 DIAGNOSIS — R10.32 LLQ PAIN: ICD-10-CM

## 2019-03-07 DIAGNOSIS — N28.89 RENAL MASS OF UNKNOWN NATURE: ICD-10-CM

## 2019-03-07 LAB
BILIRUB SERPL-MCNC: ABNORMAL MG/DL
BLOOD URINE, POC: ABNORMAL
COLOR, POC UA: YELLOW
GLUCOSE UR QL STRIP: ABNORMAL
KETONES UR QL STRIP: ABNORMAL
LEUKOCYTE ESTERASE URINE, POC: ABNORMAL
NITRITE, POC UA: ABNORMAL
PH, POC UA: 5
PROTEIN, POC: ABNORMAL
SPECIFIC GRAVITY, POC UA: 1
UROBILINOGEN, POC UA: ABNORMAL

## 2019-03-07 PROCEDURE — 99213 OFFICE O/P EST LOW 20 MIN: CPT | Mod: S$PBB,,, | Performed by: NURSE PRACTITIONER

## 2019-03-07 PROCEDURE — 99999 PR PBB SHADOW E&M-EST. PATIENT-LVL IV: CPT | Mod: PBBFAC,,, | Performed by: NURSE PRACTITIONER

## 2019-03-07 PROCEDURE — 99214 OFFICE O/P EST MOD 30 MIN: CPT | Mod: PBBFAC,PO | Performed by: NURSE PRACTITIONER

## 2019-03-07 PROCEDURE — 99213 PR OFFICE/OUTPT VISIT, EST, LEVL III, 20-29 MIN: ICD-10-PCS | Mod: S$PBB,,, | Performed by: NURSE PRACTITIONER

## 2019-03-07 PROCEDURE — 81002 URINALYSIS NONAUTO W/O SCOPE: CPT | Mod: PBBFAC,PO | Performed by: NURSE PRACTITIONER

## 2019-03-07 PROCEDURE — 99999 PR PBB SHADOW E&M-EST. PATIENT-LVL IV: ICD-10-PCS | Mod: PBBFAC,,, | Performed by: NURSE PRACTITIONER

## 2019-03-07 RX ORDER — DICLOFENAC SODIUM 10 MG/G
GEL TOPICAL
COMMUNITY
End: 2019-05-01

## 2019-03-07 NOTE — PROGRESS NOTES
Subjective:       Patient ID: Denisa Virgen is a 78 y.o. female.    Chief Complaint: new pt. visit (nocturia)    HPI  Denisa Virgen is a 78 y.o. female who presents today for follow up in regards to her nocturia.  She saw Dr. Parrish on 01/11/19.  At that time she was started on myrbetriq.  It appears she had a UTI at that time as well.  Since this has cleared she has been feeling better.  She has frequency during the day x 2-3 hours.  She has some UUI and FROY on occasion, but does feel it is better than it was.  She has nocturia x 1 on occasion.  She denies any feeling of PVF.  She denies any vaginal discharge or bleeding.  She did have a CT which showed a renal mass with ultrasound recommended.  The ultrasound was ordered, but the pt has not had this done as of yet.  Overall though, she feels that she is doing fairly well.    Review of Systems   Constitutional: Negative for activity change, fever and unexpected weight change.   HENT: Negative for hearing loss.    Eyes: Negative for visual disturbance.   Respiratory: Negative for shortness of breath and wheezing.    Cardiovascular: Negative for chest pain, palpitations and leg swelling.   Gastrointestinal: Negative for abdominal pain, constipation and diarrhea.   Genitourinary: Positive for frequency. Negative for dyspareunia, dysuria, urgency, vaginal bleeding and vaginal discharge.   Musculoskeletal: Negative for gait problem and neck pain.   Skin: Negative for rash and wound.   Allergic/Immunologic: Negative for immunocompromised state.   Neurological: Negative for tremors, speech difficulty and weakness.   Hematological: Does not bruise/bleed easily.   Psychiatric/Behavioral: Negative for agitation and confusion.       Objective:      Physical Exam   Constitutional: She is oriented to person, place, and time. She appears well-developed and well-nourished. No distress.   HENT:   Head: Normocephalic and atraumatic.   Neck: Neck supple. No thyromegaly present.    Pulmonary/Chest: Effort normal. No respiratory distress.   Abdominal: Soft. There is no tenderness. No hernia.   Musculoskeletal: Normal range of motion.   Neurological: She is alert and oriented to person, place, and time.   Skin: Skin is warm and dry. No rash noted.   Psychiatric: She has a normal mood and affect. Her behavior is normal.           Assessment:       1. Urinary frequency    2. Nocturia    3. LLQ pain    4. Vaginal discharge    5. Renal mass of unknown nature        Plan:       Urinary frequency- continue myrbetriq at this time  -     POCT urine dipstick without microscope    Nocturia- continue myrbetriq    LLQ pain- - resolved at this time.    Vaginal discharge- resolved    Renal mass of unknown nature- renal u.s. Was ordered on 02/15/19.  We will assist the pt in getting this test scheduled     RTC 3 months

## 2019-03-28 ENCOUNTER — HOSPITAL ENCOUNTER (OUTPATIENT)
Dept: RADIOLOGY | Facility: HOSPITAL | Age: 79
Discharge: HOME | End: 2019-03-28
Attending: OBSTETRICS & GYNECOLOGY
Payer: MEDICARE

## 2019-03-28 DIAGNOSIS — N28.89 RENAL MASS: ICD-10-CM

## 2019-03-28 PROCEDURE — 76770 US EXAM ABDO BACK WALL COMP: CPT | Mod: TC

## 2019-03-28 PROCEDURE — 76770 US EXAM ABDO BACK WALL COMP: CPT | Mod: 26,,, | Performed by: RADIOLOGY

## 2019-03-28 PROCEDURE — 76770 US RETROPERITONEAL COMPLETE: ICD-10-PCS | Mod: 26,,, | Performed by: RADIOLOGY

## 2019-04-02 ENCOUNTER — TELEPHONE (OUTPATIENT)
Dept: UROGYNECOLOGY | Facility: CLINIC | Age: 79
End: 2019-04-02

## 2019-04-02 DIAGNOSIS — N83.201 RIGHT OVARIAN CYST: Primary | ICD-10-CM

## 2019-04-02 NOTE — TELEPHONE ENCOUNTER
The mass that was noted on the CT, was visible on the [place]  The radiologist believes it to be a cyst which was noted previously and seems to be relatively unchanged.  Dr. Parrish will have received these results to review as well.  If she has any further recommendations, we will let you know, but for now everything appears relatively benign.

## 2019-04-02 NOTE — TELEPHONE ENCOUNTER
Called and left message that I will have anita review the US and get back with her with the results.

## 2019-04-02 NOTE — TELEPHONE ENCOUNTER
----- Message from Elvis Dubose sent at 4/2/2019 11:51 AM CDT -----  Contact: Patient  Type: Needs Medical Advice    Who Called:  Patient  Best Call Back Number: [phone]  Additional Information: Patient would like to discuss ultrasound test results. Thanks!

## 2019-04-16 ENCOUNTER — TELEPHONE (OUTPATIENT)
Dept: UROGYNECOLOGY | Facility: CLINIC | Age: 79
End: 2019-04-16

## 2019-04-16 ENCOUNTER — HOSPITAL ENCOUNTER (OUTPATIENT)
Dept: RADIOLOGY | Facility: HOSPITAL | Age: 79
Discharge: HOME | End: 2019-04-16
Attending: OBSTETRICS & GYNECOLOGY
Payer: MEDICARE

## 2019-04-16 DIAGNOSIS — R19.00 PELVIC MASS: Primary | ICD-10-CM

## 2019-04-16 DIAGNOSIS — D39.10 NEOPLASM OF UNCERTAIN BEHAVIOR OF OVARY, UNSPECIFIED LATERALITY: ICD-10-CM

## 2019-04-16 DIAGNOSIS — N83.201 RIGHT OVARIAN CYST: ICD-10-CM

## 2019-04-16 PROCEDURE — 76856 US EXAM PELVIC COMPLETE: CPT | Mod: 26,,, | Performed by: RADIOLOGY

## 2019-04-16 PROCEDURE — 76830 US PELVIS COMP WITH TRANSVAG NON-OB (XPD): ICD-10-PCS | Mod: 26,,, | Performed by: RADIOLOGY

## 2019-04-16 PROCEDURE — 76830 TRANSVAGINAL US NON-OB: CPT | Mod: TC

## 2019-04-16 PROCEDURE — 76830 TRANSVAGINAL US NON-OB: CPT | Mod: 26,,, | Performed by: RADIOLOGY

## 2019-04-16 PROCEDURE — 76856 US PELVIS COMP WITH TRANSVAG NON-OB (XPD): ICD-10-PCS | Mod: 26,,, | Performed by: RADIOLOGY

## 2019-04-16 NOTE — TELEPHONE ENCOUNTER
Called reviewed the sonogram findings with the patient. Recommend Gyn onc evaluation ordered . Given growing cystic mass likely from ovarian origin in a post menopausal women.

## 2019-04-17 ENCOUNTER — TELEPHONE (OUTPATIENT)
Dept: UROGYNECOLOGY | Facility: CLINIC | Age: 79
End: 2019-04-17

## 2019-04-17 DIAGNOSIS — N83.201 CYST OF RIGHT OVARY: Primary | ICD-10-CM

## 2019-04-18 ENCOUNTER — TELEPHONE (OUTPATIENT)
Dept: UROGYNECOLOGY | Facility: CLINIC | Age: 79
End: 2019-04-18

## 2019-04-18 NOTE — TELEPHONE ENCOUNTER
Spoke to pt and told her that when she comes in for the blood work then she can get her records , by just signing a release of info sheet.

## 2019-04-18 NOTE — TELEPHONE ENCOUNTER
----- Message from Bonnie Fox MA sent at 4/18/2019  1:58 PM CDT -----  Contact: Brenda Virgen daughter- in- law      ----- Message -----  From: Jaimie Torres  Sent: 4/18/2019  12:41 PM  To: Shivani Corley Staff    Calling to discuss medical records. She states she is supposed to pick them up but is needing to know if they are in [place] and can she do her CA-125 blood work in [place].The patient can be reached at [phone].

## 2019-04-18 NOTE — TELEPHONE ENCOUNTER
----- Message from Bonnie Fox MA sent at 4/18/2019 11:10 AM CDT -----  Contact: son      ----- Message -----  From: Laura Sarabia  Sent: 4/18/2019  10:23 AM  To: Shivani Corley Staff    Name of Who is Calling: YUE MARK [[id number]]    What is the request in detail: pt son calling to speak with staff in regards to test results.. Please advise    Can the clinic reply by [facility]: no      What Number to Call Back if not in [place]: [phone]

## 2019-04-22 ENCOUNTER — LAB VISIT (OUTPATIENT)
Dept: LAB | Facility: HOSPITAL | Age: 79
End: 2019-04-22
Attending: OBSTETRICS & GYNECOLOGY
Payer: MEDICARE

## 2019-04-22 DIAGNOSIS — D39.10 NEOPLASM OF UNCERTAIN BEHAVIOR OF OVARY, UNSPECIFIED LATERALITY: ICD-10-CM

## 2019-04-22 DIAGNOSIS — R19.00 PELVIC MASS: ICD-10-CM

## 2019-04-22 LAB — CANCER AG125 SERPL-ACNC: 15 U/ML (ref 0–30)

## 2019-04-22 PROCEDURE — 36415 COLL VENOUS BLD VENIPUNCTURE: CPT

## 2019-04-22 PROCEDURE — 86304 IMMUNOASSAY TUMOR CA 125: CPT

## 2019-04-23 ENCOUNTER — TELEPHONE (OUTPATIENT)
Dept: UROGYNECOLOGY | Facility: CLINIC | Age: 79
End: 2019-04-23

## 2019-04-23 NOTE — TELEPHONE ENCOUNTER
----- Message from Bonnie Fox MA sent at 4/23/2019  3:09 PM CDT -----   does not return until 5/13/18 If patient is okay with waiting that long no probelm  ----- Message -----  From: Alivia Stark LPN  Sent: 4/23/2019   2:56 PM  To: Bonnie Fox MA    Please send to Dr Parrish to review, she had ordered this lab.  ----- Message -----  From: Bonnie Fox MA  Sent: 4/23/2019   2:39 PM  To: Alivia Stark LPN        ----- Message -----  From: Sandoval Romeo  Sent: 4/23/2019   2:31 PM  To: Shivani Corley Staff    PT'S DAUGHTER CALLING FOR HER RESULTS [phone]

## 2019-04-23 NOTE — TELEPHONE ENCOUNTER
Her  is at 15 with the normal limits of 0-30.  I believe she is going to be seen by Dr. Feliciano and I would encourage her to keep this appt.

## 2019-04-28 ENCOUNTER — DOCUMENTATION ONLY (OUTPATIENT)
Dept: GYNECOLOGIC ONCOLOGY | Facility: CLINIC | Age: 79
End: 2019-04-28

## 2019-04-28 NOTE — PROGRESS NOTES
Referred by Dr. Parrish for pelvic mass.     Seen by Dr. Parrish for nocturia.   Pelvic US 4/16/19  FINDINGS:  A uterus is not seen.  Normal ovarian tissue is not seen. In the right adnexa is a 4.1 x 2.7 cm multi cystic complex mass.  No free fluid or free air is seen.     15    Of note review of prior imaging CT urogram from 11/2017 demonstrates small cystic masses within the right ovary measuring up to 1.6cm (total ovary 3.3cm)    Prior abdominal surgeries hysterectomy     Family history    Medical comorbidities include COPD, HLD.

## 2019-04-29 ENCOUNTER — TELEPHONE (OUTPATIENT)
Dept: GYNECOLOGIC ONCOLOGY | Facility: CLINIC | Age: 79
End: 2019-04-29

## 2019-05-01 ENCOUNTER — OFFICE VISIT (OUTPATIENT)
Dept: RHEUMATOLOGY | Facility: CLINIC | Age: 79
End: 2019-05-01
Payer: MEDICARE

## 2019-05-01 VITALS
BODY MASS INDEX: 26.93 KG/M2 | SYSTOLIC BLOOD PRESSURE: 134 MMHG | DIASTOLIC BLOOD PRESSURE: 69 MMHG | WEIGHT: 177.13 LBS

## 2019-05-01 DIAGNOSIS — M15.9 PRIMARY OSTEOARTHRITIS INVOLVING MULTIPLE JOINTS: Primary | ICD-10-CM

## 2019-05-01 PROCEDURE — 99213 PR OFFICE/OUTPT VISIT, EST, LEVL III, 20-29 MIN: ICD-10-PCS | Mod: ,,, | Performed by: INTERNAL MEDICINE

## 2019-05-01 PROCEDURE — 99213 OFFICE O/P EST LOW 20 MIN: CPT | Mod: ,,, | Performed by: INTERNAL MEDICINE

## 2019-05-01 RX ORDER — ESTRADIOL 0.1 MG/G
0.5 CREAM VAGINAL
COMMUNITY
Start: 2019-01-14 | End: 2020-03-03

## 2019-05-01 RX ORDER — DICLOFENAC SODIUM 10 MG/G
GEL TOPICAL 3 TIMES DAILY
Qty: 1 TUBE | Refills: 3 | Status: SHIPPED | OUTPATIENT
Start: 2019-05-01 | End: 2020-03-03 | Stop reason: SDUPTHER

## 2019-05-01 NOTE — PROGRESS NOTES
[facility] RHEUMATOLOGY            PROGRESS NOTE      Subjective:       Patient ID:   NAME: Denisa Virgen : 1940     79 y.o. female    Referring Doc: No ref. provider found  Other Physicians:    Chief Complaint:  Osteoarthritis      History of Present Illness:     Patient returns today for a regularly scheduled follow-up visit for  Osteoarthritis     The patient is doing okay. Arthralgias relieved with Voltaren gel. No chest pains. Breathing better after new medication prescribed by her pulmonologist. No GI complaints.            ROS:   GEN:  No  fever, night sweats . weight is stable   + some fatigue  SKIN: no rashes, no bruising, no ulcerations, no Raynaud's  HEENT: no HA's, No visual changes, no mucosal ulcers, no sicca symptoms,  CV:   no CP, SOB, PND, + occ YOON, no orthopnea, no palpitations  PULM: normal with no SOB, cough, hemoptysis, sputum or pleuritic pain  GI:  no abdominal pain, nausea, vomiting, constipation, diarrhea, melanotic stools, BRBPR, hematemesis, no dysphagia  :   no dysuria  NEURO: no paresthesias, headaches, visual disturbances, muscle weakness  MUSCULOSKELETAL:no joint swelling, prolonged AM stiffness, no back pain, no muscle pain  Allergies:  Review of patient's allergies indicates:   Allergen Reactions    Levofloxacin Rash       Medications:    Current Outpatient Medications:     fluticasone (FLONASE) 50 mcg/actuation nasal spray, , Disp: , Rfl:     fluticasone-umeclidin-vilanter (TRELEGY ELLIPTA) 100-62.5-25 mcg DsDv, Inhale 1 puff into the lungs., Disp: , Rfl:     krill/om-3/dha/epa/phospho/ast (MEGARED OMEGA-3 KRILL OIL ORAL), Take by mouth., Disp: , Rfl:     loratadine (CLARITIN) 10 mg tablet, Take 10 mg by mouth once daily., Disp: , Rfl:     mirabegron (MYRBETRIQ) 25 mg Tb24 ER tablet, Take 1 tablet (25 mg total) by mouth once daily., Disp: 30 tablet, Rfl: 11    montelukast (SINGULAIR) 10 mg tablet, TAKE ONE TABLET BY MOUTH ONCE DAILY, Disp: , Rfl:      nortriptyline (PAMELOR) 50 MG capsule, Take 2 capsules as needed by oral route at bedtime as needed., Disp: , Rfl:     tiZANidine (ZANAFLEX) 4 MG tablet, , Disp: , Rfl:     diclofenac sodium (VOLTAREN) 1 % Gel, Apply topically 3 (three) times daily. Apply to affected joint ( 2gm) bid-tid prn, Disp: 1 Tube, Rfl: 3    estradiol (ESTRACE) 0.01 % (0.1 mg/gram) vaginal cream, Place 0.5 g vaginally., Disp: , Rfl:     PMHx/PSHx Updates:        Objective:     Vitals:  Blood pressure 134/69, weight 80.3 kg (177 lb 1.6 oz).    Physical Examination:   GEN: no apparent distress, comfortable; AAOx3  SKIN: no rashes,no ulceration, no Raynaud's, no petechiae, no SQ nodules,  HEAD: normal  EYES: no pallor, no icterus,  NECK: no masses, thyroid normal, trachea midline, no LAD/LN's, supple  CV: RRR with no murmur; l S1 and S2 reg. ,no gallop no rubs,   CHEST: Normal respiratory effort; CTAB; normal breath sounds; no wheeze or crackles  MUSC/Skeletal: ROM normal; no crepitus; joints without synovitis,  no deformities  No joint swelling or tenderness of PIP, MCP, wrist, elbow, shoulder, or knee joints  EXTREM: no clubbing, cyanosis, no edema,normal  pulses   NEURO: grossly intact; motor WNL;   PSYCH: normal mood, affect and behavior  LYMPH: normal cervical, supraclavicular          Labs:   Lab Results   Component Value Date    WBC 2.8 (L) 01/02/2019    HGB 13.7 01/02/2019    HCT 43.4 01/02/2019    MCV 99.1 (H) 01/02/2019     01/02/2019    CMP  @LASTLAB(NA,K,CL,CO2,GLU,BUN,Creatinine,Calcium,PROT,Albumin,Bilitot,Alkphos,AST,ALT,CRP,ESR,RF,CCP,MERRICK,SSA,CPK,uric acid) )@  I have reviewed all available lab results and radiology reports.    Radiology/Diagnostic Studies:        Assessment/Plan:   (1) 79 y.o. female with diagnosis of Osteoarthritis..stable    We reviewed recent blood work :MARCELINA martinez            Discussion:     I have explained all of the above in detail and the patient understands all of the current recommendation(s).  I have answered all questions to the best of my ability and to their complete satisfaction.       The patient is to continue with the current management plan         RTC in  5 months       Electronically signed by Mohinder Ordonez MD

## 2019-06-06 ENCOUNTER — TELEPHONE (OUTPATIENT)
Dept: UROGYNECOLOGY | Facility: CLINIC | Age: 79
End: 2019-06-06

## 2019-06-06 NOTE — TELEPHONE ENCOUNTER
----- Message from Chandler Rajan sent at 6/6/2019 10:00 AM CDT -----  Contact: pt  Type: Needs Medical Advice    Who Called:  pt    Best Call Back Number:[phone]   Additional Information: pt will not be able to make her appointment today due to weather. She would like to schedule an appointment. 1st available for me to schedule was June 11th in the morning and she stated that time would not work. Pt would also like to discuss the mass that was found on her ovary. Please call to advise.

## 2020-01-19 NOTE — TELEPHONE ENCOUNTER
Called patient message left. Renal sonogram results are Bosniak 2 - no additional work up needed. There is an incidental finding of an ovarian cyst which is noted recommend a TVS in 6 months. Order placed.    fever, persistent cough fever, persistent cough fever, persistent cough fever, persistent cough fever, persistent cough fever, persistent cough

## 2020-03-03 ENCOUNTER — OFFICE VISIT (OUTPATIENT)
Dept: RHEUMATOLOGY | Facility: CLINIC | Age: 80
End: 2020-03-03
Payer: MEDICARE

## 2020-03-03 VITALS
SYSTOLIC BLOOD PRESSURE: 130 MMHG | WEIGHT: 168.31 LBS | DIASTOLIC BLOOD PRESSURE: 80 MMHG | TEMPERATURE: 99 F | BODY MASS INDEX: 25.59 KG/M2

## 2020-03-03 DIAGNOSIS — M15.9 PRIMARY OSTEOARTHRITIS INVOLVING MULTIPLE JOINTS: Primary | ICD-10-CM

## 2020-03-03 DIAGNOSIS — L65.9 HAIR LOSS: ICD-10-CM

## 2020-03-03 PROCEDURE — 99213 OFFICE O/P EST LOW 20 MIN: CPT | Mod: S$GLB,,, | Performed by: INTERNAL MEDICINE

## 2020-03-03 PROCEDURE — 99213 PR OFFICE/OUTPT VISIT, EST, LEVL III, 20-29 MIN: ICD-10-PCS | Mod: S$GLB,,, | Performed by: INTERNAL MEDICINE

## 2020-03-03 RX ORDER — BIOTIN 1 MG
1000 TABLET ORAL 3 TIMES DAILY
COMMUNITY

## 2020-03-03 RX ORDER — FLUCONAZOLE 150 MG/1
TABLET ORAL
COMMUNITY

## 2020-03-03 RX ORDER — BENZONATATE 100 MG/1
100 CAPSULE ORAL 3 TIMES DAILY PRN
COMMUNITY

## 2020-03-03 RX ORDER — DICLOFENAC SODIUM 10 MG/G
GEL TOPICAL 3 TIMES DAILY
Qty: 1 TUBE | Refills: 3 | Status: SHIPPED | OUTPATIENT
Start: 2020-03-03

## 2020-03-03 RX ORDER — LEVOCETIRIZINE DIHYDROCHLORIDE 5 MG/1
5 TABLET, FILM COATED ORAL
COMMUNITY
Start: 2020-02-24 | End: 2021-02-23

## 2020-03-03 RX ORDER — CETIRIZINE HYDROCHLORIDE 10 MG/1
10 TABLET ORAL
COMMUNITY
Start: 2019-08-16

## 2020-03-03 RX ORDER — ALPRAZOLAM 0.25 MG/1
0.25 TABLET ORAL
COMMUNITY
Start: 2020-01-30 | End: 2021-01-29

## 2020-03-03 RX ORDER — SULFAMETHOXAZOLE AND TRIMETHOPRIM 800; 160 MG/1; MG/1
1 TABLET ORAL 2 TIMES DAILY
COMMUNITY

## 2020-03-03 NOTE — PROGRESS NOTES
[facility] RHEUMATOLOGY            PROGRESS NOTE      Subjective:       Patient ID:   NAME: Denisa Virgen : 1940     79 y.o. female    Referring Doc: No ref. provider found  Other Physicians:    Chief Complaint:  Osteoarthritis      History of Present Illness:     Patient returns today for a regularly scheduled follow-up visit for    osteoarthritis   The patient experiences some pain in the right shoulder occasionally.  She wants to know why she has a tremor on the right hand.  Tremor is at rest, for past few months  No headaches.  No chest pains.  No cough.  Occasional shortness of breath.  She has fatigue.  She is also complaining of hair loss.  No fevers weight gain.            ROS:   GEN:  No  fever, night sweats . weight is stable   + fatigue  SKIN: no rashes, no bruising, no ulcerations, no Raynaud's  HEENT: no HA's, No visual changes, no mucosal ulcers, no sicca symptoms,  CV:   no CP, SOB, PND,+ occ  YOON, no orthopnea, no palpitations  PULM: normal with no SOB, cough, hemoptysis, sputum or pleuritic pain  GI:  no abdominal pain, nausea, vomiting, constipation, diarrhea, melanotic stools, BRBPR, hematemesis, no dysphagia  :   no dysuria  NEURO: no paresthesias, headaches, visual disturbances,   MUSCULOSKELETAL:no joint swelling,  Allergies:  Review of patient's allergies indicates:   Allergen Reactions    Levofloxacin Rash       Medications:    Current Outpatient Medications:     ALPRAZolam (XANAX) 0.25 MG tablet, Take 0.25 mg by mouth., Disp: , Rfl:     benzonatate (TESSALON) 100 MG capsule, Take 100 mg by mouth 3 (three) times daily as needed for Cough., Disp: , Rfl:     biotin 1 mg tablet, Take 1,000 mcg by mouth 3 (three) times daily., Disp: , Rfl:     CALCIUM ORAL, Take by mouth., Disp: , Rfl:     cetirizine (ZYRTEC) 10 MG tablet, Take 10 mg by mouth., Disp: , Rfl:     diclofenac sodium (VOLTAREN) 1 % Gel, Apply topically 3 (three) times daily. Apply to affected joint ( 2gm) bid-tid  prn, Disp: 1 Tube, Rfl: 3    fluconazole (DIFLUCAN) 150 MG Tab, fluconazole 150 mg tablet, Disp: , Rfl:     fluticasone (FLONASE) 50 mcg/actuation nasal spray, , Disp: , Rfl:     fluticasone-umeclidin-vilanter (TRELEGY ELLIPTA) 100-62.5-25 mcg DsDv, Inhale 1 puff into the lungs., Disp: , Rfl:     levocetirizine (XYZAL) 5 MG tablet, Take 5 mg by mouth., Disp: , Rfl:     mirabegron (MYRBETRIQ) 25 mg Tb24 ER tablet, Take 1 tablet (25 mg total) by mouth once daily., Disp: 30 tablet, Rfl: 11    montelukast (SINGULAIR) 10 mg tablet, TAKE ONE TABLET BY MOUTH ONCE DAILY, Disp: , Rfl:     nortriptyline (PAMELOR) 50 MG capsule, Take 2 capsules as needed by oral route at bedtime as needed., Disp: , Rfl:     sulfamethoxazole-trimethoprim 800-160mg (BACTRIM DS) 800-160 mg Tab, Take 1 tablet by mouth 2 (two) times daily., Disp: , Rfl:     tiZANidine (ZANAFLEX) 4 MG tablet, , Disp: , Rfl:     krill/om-3/dha/epa/phospho/ast (MEGARED OMEGA-3 KRILL OIL ORAL), Take by mouth., Disp: , Rfl:     PMHx/PSHx Updates:        Objective:     Vitals:  Blood pressure 130/80, temperature 98.7 °F (37.1 °C), weight 76.3 kg (168 lb 4.8 oz).    Physical Examination:   GEN: no apparent distress, comfortable; AAOx3  SKIN: no rashes,no ulceration, no Raynaud's, no petechiae, no SQ nodules,  HEAD: normal  EYES: no pallor, no icterus,Mouth : no dryness.No ulcerations  NECK: no masses, thyroid normal, trachea midline, no LAD/LN's, supple  CV: RRR with no murmur; l S1 and S2 reg. ,no gallop no rubs,   CHEST: Normal respiratory effort; CTAB; normal breath sounds; no wheeze or crackles  MUSC/Skeletal: ROM normal; no crepitus; joints without synovitis,  no deformities  No joint swelling or tenderness of PIP, MCP, wrist, elbow, shoulder, or knee joints  EXTREM: no clubbing, cyanosis, no edema,normal  pulses   NEURO: resting tremor r hand,slow speech and walking  PSYCH: normal mood, affect and behavior  LYMPH: normal cervical,  supraclavicular          Labs:   Lab Results   Component Value Date    WBC 2.8 (L) 01/02/2019    HGB 13.7 01/02/2019    HCT 43.4 01/02/2019    MCV 99.1 (H) 01/02/2019     01/02/2019    CMP  @LASTLAB(NA,K,CL,CO2,GLU,BUN,Creatinine,Calcium,PROT,Albumin,Bilitot,Alkphos,AST,ALT,CRP,ESR,RF,CCP,MERRICK,SSA,CPK,uric acid) )@  I have reviewed all available lab results and radiology reports.    Radiology/Diagnostic Studies:        Assessment/Plan:   (1) 79 y.o. female with diagnosis of resting tremors on R hand for last few months, probably  sec to Parkinson's ( sl slow speech and slow walk with some shuffling)  Pt needs to see neurologist  For Dx and Tx  She will make app[facility] to [facility] one [facility]  Osteoarthritis: stable.  Can use Voltaren gel as needed.    Plan CBC CMP and TSH          Discussion:     I have explained all of the above in detail and the patient understands all of the current recommendation(s). I have answered all questions to the best of my ability and to their complete satisfaction.       The patient is to continue with the current management plan         RTC in   6-12 months      Electronically signed by Mohinder Ordonez MD

## (undated) DEVICE — GLOVE SURG ULTRA TOUCH 7

## (undated) DEVICE — SPONGE GAUZE 16PLY 4X4

## (undated) DEVICE — SET CYSTO IRRIGATION UNIV SPIK

## (undated) DEVICE — SHEET DRAPE MEDIUM

## (undated) DEVICE — WATER STERILE INJ 500ML BAG

## (undated) DEVICE — JELLY LUBRICATING STERILE 5 GR

## (undated) DEVICE — Device

## (undated) DEVICE — SEE L#120831

## (undated) DEVICE — SEE MEDLINE ITEM 152651

## (undated) DEVICE — DRAPE MINOR PROCEDURE